# Patient Record
Sex: MALE | Race: WHITE | NOT HISPANIC OR LATINO | Employment: UNEMPLOYED | RURAL
[De-identification: names, ages, dates, MRNs, and addresses within clinical notes are randomized per-mention and may not be internally consistent; named-entity substitution may affect disease eponyms.]

---

## 2021-02-17 ENCOUNTER — OFFICE VISIT (OUTPATIENT)
Dept: FAMILY MEDICINE CLINIC | Facility: CLINIC | Age: 21
End: 2021-02-17

## 2021-02-17 VITALS
OXYGEN SATURATION: 98 % | BODY MASS INDEX: 28.92 KG/M2 | RESPIRATION RATE: 18 BRPM | TEMPERATURE: 98.2 F | HEART RATE: 113 BPM | HEIGHT: 65 IN | DIASTOLIC BLOOD PRESSURE: 86 MMHG | SYSTOLIC BLOOD PRESSURE: 128 MMHG | WEIGHT: 173.6 LBS

## 2021-02-17 DIAGNOSIS — R25.3 TWITCHING: ICD-10-CM

## 2021-02-17 DIAGNOSIS — F95.2 TOURETTE'S SYNDROME: Primary | ICD-10-CM

## 2021-02-17 DIAGNOSIS — Z13.29 SCREENING FOR HYPOTHYROIDISM: ICD-10-CM

## 2021-02-17 DIAGNOSIS — Z76.89 ENCOUNTER TO ESTABLISH CARE: ICD-10-CM

## 2021-02-17 DIAGNOSIS — Z13.220 SCREENING FOR HYPERLIPIDEMIA: ICD-10-CM

## 2021-02-17 PROBLEM — L08.9: Status: ACTIVE | Noted: 2018-05-29

## 2021-02-17 PROBLEM — L60.0 INGROWN NAIL OF FIFTH TOE OF LEFT FOOT: Status: ACTIVE | Noted: 2018-05-29
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
## 2021-02-17 PROBLEM — S90.465A: Status: ACTIVE | Noted: 2018-05-29

## 2021-02-17 PROCEDURE — 99214 OFFICE O/P EST MOD 30 MIN: CPT | Performed by: FAMILY MEDICINE

## 2021-02-17 NOTE — ASSESSMENT & PLAN NOTE
Psychological condition is worsening.  Referral neurology   This is a clinical diagnosis based on symptoms.  We will order blood work as well as a CT scan and EEG.  Psychological condition  will be reassessed at the next regular appointment.

## 2021-02-17 NOTE — ASSESSMENT & PLAN NOTE
The worsening twitching has likely due to symptoms of Tourette's syndrome, which has been undiagnosed.

## 2021-02-18 ENCOUNTER — TELEPHONE (OUTPATIENT)
Dept: FAMILY MEDICINE CLINIC | Facility: CLINIC | Age: 21
End: 2021-02-18

## 2021-02-18 DIAGNOSIS — F95.2 TOURETTE'S SYNDROME: Primary | ICD-10-CM

## 2021-02-18 DIAGNOSIS — R25.3 TWITCHING: ICD-10-CM

## 2021-02-18 LAB
25(OH)D3+25(OH)D2 SERPL-MCNC: 22 NG/ML (ref 30–100)
ALBUMIN SERPL-MCNC: 4.8 G/DL (ref 4.1–5.2)
ALBUMIN/GLOB SERPL: 1.5 {RATIO} (ref 1.2–2.2)
ALP SERPL-CCNC: 112 IU/L (ref 39–117)
ALT SERPL-CCNC: 32 IU/L (ref 0–44)
AST SERPL-CCNC: 21 IU/L (ref 0–40)
BASOPHILS # BLD AUTO: 0.1 X10E3/UL (ref 0–0.2)
BASOPHILS NFR BLD AUTO: 1 %
BILIRUB SERPL-MCNC: 0.3 MG/DL (ref 0–1.2)
BUN SERPL-MCNC: 10 MG/DL (ref 6–20)
BUN/CREAT SERPL: 12 (ref 9–20)
CALCIUM SERPL-MCNC: 9.7 MG/DL (ref 8.7–10.2)
CHLORIDE SERPL-SCNC: 101 MMOL/L (ref 96–106)
CHOLEST SERPL-MCNC: 137 MG/DL (ref 100–199)
CHOLEST/HDLC SERPL: 4.2 RATIO (ref 0–5)
CO2 SERPL-SCNC: 24 MMOL/L (ref 20–29)
CREAT SERPL-MCNC: 0.86 MG/DL (ref 0.76–1.27)
EOSINOPHIL # BLD AUTO: 0.4 X10E3/UL (ref 0–0.4)
EOSINOPHIL NFR BLD AUTO: 4 %
ERYTHROCYTE [DISTWIDTH] IN BLOOD BY AUTOMATED COUNT: 12.5 % (ref 11.6–15.4)
FOLATE SERPL-MCNC: 10.4 NG/ML
GLOBULIN SER CALC-MCNC: 3.2 G/DL (ref 1.5–4.5)
GLUCOSE SERPL-MCNC: 92 MG/DL (ref 65–99)
HCT VFR BLD AUTO: 48.5 % (ref 37.5–51)
HDLC SERPL-MCNC: 33 MG/DL
HGB BLD-MCNC: 17 G/DL (ref 13–17.7)
IMM GRANULOCYTES # BLD AUTO: 0 X10E3/UL (ref 0–0.1)
IMM GRANULOCYTES NFR BLD AUTO: 0 %
LDLC SERPL CALC-MCNC: 80 MG/DL (ref 0–99)
LYMPHOCYTES # BLD AUTO: 2.2 X10E3/UL (ref 0.7–3.1)
LYMPHOCYTES NFR BLD AUTO: 24 %
MCH RBC QN AUTO: 32.7 PG (ref 26.6–33)
MCHC RBC AUTO-ENTMCNC: 35.1 G/DL (ref 31.5–35.7)
MCV RBC AUTO: 93 FL (ref 79–97)
MONOCYTES # BLD AUTO: 0.7 X10E3/UL (ref 0.1–0.9)
MONOCYTES NFR BLD AUTO: 7 %
NEUTROPHILS # BLD AUTO: 5.8 X10E3/UL (ref 1.4–7)
NEUTROPHILS NFR BLD AUTO: 64 %
PLATELET # BLD AUTO: 381 X10E3/UL (ref 150–450)
POTASSIUM SERPL-SCNC: 4.7 MMOL/L (ref 3.5–5.2)
PROT SERPL-MCNC: 8 G/DL (ref 6–8.5)
RBC # BLD AUTO: 5.2 X10E6/UL (ref 4.14–5.8)
SODIUM SERPL-SCNC: 141 MMOL/L (ref 134–144)
TRIGL SERPL-MCNC: 136 MG/DL (ref 0–149)
TSH SERPL DL<=0.005 MIU/L-ACNC: 2.15 UIU/ML (ref 0.45–4.5)
VIT B12 SERPL-MCNC: 603 PG/ML (ref 232–1245)
VLDLC SERPL CALC-MCNC: 24 MG/DL (ref 5–40)
WBC # BLD AUTO: 9 X10E3/UL (ref 3.4–10.8)

## 2021-02-23 ENCOUNTER — HOSPITAL ENCOUNTER (OUTPATIENT)
Dept: NEUROLOGY | Facility: HOSPITAL | Age: 21
Discharge: HOME | End: 2021-02-23
Admitting: FAMILY MEDICINE

## 2021-02-23 PROCEDURE — 95816 EEG AWAKE AND DROWSY: CPT | Performed by: PSYCHIATRY & NEUROLOGY

## 2021-02-23 PROCEDURE — 95816 EEG AWAKE AND DROWSY: CPT

## 2021-02-24 ENCOUNTER — OFFICE VISIT (OUTPATIENT)
Dept: FAMILY MEDICINE CLINIC | Facility: CLINIC | Age: 21
End: 2021-02-24

## 2021-02-24 VITALS
DIASTOLIC BLOOD PRESSURE: 70 MMHG | BODY MASS INDEX: 29.42 KG/M2 | WEIGHT: 176.6 LBS | OXYGEN SATURATION: 98 % | RESPIRATION RATE: 18 BRPM | SYSTOLIC BLOOD PRESSURE: 140 MMHG | TEMPERATURE: 98.1 F | HEIGHT: 65 IN | HEART RATE: 106 BPM

## 2021-02-24 DIAGNOSIS — E55.9 VITAMIN D DEFICIENCY: ICD-10-CM

## 2021-02-24 DIAGNOSIS — F95.2 TOURETTE'S SYNDROME: Primary | ICD-10-CM

## 2021-02-24 PROCEDURE — 99213 OFFICE O/P EST LOW 20 MIN: CPT | Performed by: FAMILY MEDICINE

## 2021-02-24 NOTE — PROGRESS NOTES
"Chief Complaint  Labs Only    Subjective    History of Present Illness        Domenic Sandoval presents to [facility] for   2/24/2021- The patient is here today and following up on the labs that he had done on his last visit with us. His vitamin D was very low at 22. His cholesterol panel had a slightly elevated level.        Objective   Vital Signs:   Visit Vitals  /70   Pulse 106   Temp 98.1 °F (36.7 °C)   Resp 18   Ht 165.1 cm (65\")   Wt 80.1 kg (176 lb 9.6 oz)   SpO2 98%   BMI 29.39 kg/m²       Physical Exam  Vitals signs reviewed.   Constitutional:       Appearance: He is well-developed.   HENT:      Head: Normocephalic.      Right Ear: External ear normal.      Left Ear: External ear normal.      Nose: Nose normal.   Eyes:      Conjunctiva/sclera: Conjunctivae normal.   Neck:      Musculoskeletal: Normal range of motion and neck supple.   Cardiovascular:      Rate and Rhythm: Normal rate and regular rhythm.   Pulmonary:      Effort: Pulmonary effort is normal.      Breath sounds: Normal breath sounds.   Genitourinary:     Rectum: Guaiac result negative.   Musculoskeletal: Normal range of motion.   Skin:     General: Skin is warm and dry.      Capillary Refill: Capillary refill takes less than 2 seconds.   Neurological:      Mental Status: He is alert and oriented to person, place, and time.            Result Review :      Common labs    Common Labsle 2/17/21 2/17/21 2/17/21    1557 1557 1557   Glucose  92    BUN  10    Creatinine  0.86    eGFR Non  Am  124    eGFR African Am  143    Sodium  141    Potassium  4.7    Chloride  101    Calcium  9.7    Total Protein  8.0    Albumin  4.8    Total Bilirubin  0.3    Alkaline Phosphatase  112    AST (SGOT)  21    ALT (SGPT)  32    WBC 9.0     Hemoglobin 17.0     Hematocrit 48.5     Platelets 381     Total Cholesterol   137   Triglycerides   136   HDL Cholesterol   33 (A)   LDL Cholesterol    80   (A) Abnormal value        "     CMP    CMP 2/17/21   Glucose 92   BUN 10   Creatinine 0.86   eGFR Non  Am 124   eGFR African Am 143   Sodium 141   Potassium 4.7   Chloride 101   Calcium 9.7   Total Protein 8.0   Albumin 4.8   Globulin 3.2   Total Bilirubin 0.3   Alkaline Phosphatase 112   AST (SGOT) 21   ALT (SGPT) 32           CBC    CBC 2/17/21   WBC 9.0   RBC 5.20   Hemoglobin 17.0   Hematocrit 48.5   MCV 93   MCH 32.7   MCHC 35.1   RDW 12.5   Platelets 381           CBC w/diff    CBC w/Diff 2/17/21   WBC 9.0   RBC 5.20   Hemoglobin 17.0   Hematocrit 48.5   MCV 93   MCH 32.7   MCHC 35.1   RDW 12.5   Platelets 381   Neutrophil Rel % 64   Lymphocyte Rel % 24   Monocyte Rel % 7   Eosinophil Rel % 4   Basophil Rel % 1           Lipid Panel    Lipid Panel 2/17/21   Total Cholesterol 137   Triglycerides 136   HDL Cholesterol 33 (A)   VLDL Cholesterol 24   LDL Cholesterol  80   (A) Abnormal value            TSH    TSH 2/17/21   TSH 2.150                       Assessment and Plan      Diagnoses and all orders for this visit:    1. Tourette's syndrome (Primary)  Assessment & Plan:  Psychological condition is unchanged.  Continue current treatment regimen.   EEG pending.  Psychological condition  will be reassessed in 4 weeks.      2. Vitamin D deficiency           Follow Up   No follow-ups on file.  Patient was given instructions and counseling regarding his condition or for health maintenance advice. Please see specific information pulled into the AVS if appropriate.

## 2021-02-26 ENCOUNTER — HOSPITAL ENCOUNTER (OUTPATIENT)
Dept: CT IMAGING | Facility: HOSPITAL | Age: 21
Discharge: HOME | End: 2021-02-26
Admitting: FAMILY MEDICINE

## 2021-02-26 DIAGNOSIS — R25.3 TWITCHING: ICD-10-CM

## 2021-02-26 DIAGNOSIS — F95.2 TOURETTE'S SYNDROME: ICD-10-CM

## 2021-02-26 PROCEDURE — 70450 CT HEAD/BRAIN W/O DYE: CPT

## 2021-03-12 ENCOUNTER — TELEPHONE (OUTPATIENT)
Dept: FAMILY MEDICINE CLINIC | Facility: CLINIC | Age: 21
End: 2021-03-12

## 2021-03-12 NOTE — TELEPHONE ENCOUNTER
Called patient yesterday 3/11/2021 and told him I had reached out to the Neurology department for Dr Cooley and they said that they had tried to reach out to the patient a few times and was not able to get in touch with them.   I called the patient and provided him with the name and the number of the appointment team member for the neurology office ( [place] [phone] ext [phone])   Domenic said that he was going to give them a call to get an appointment.     He also responded to the [employer] message we sent to discuss a new mediation that dr. salinas wants tos tart him on.

## 2021-03-15 ENCOUNTER — TELEMEDICINE (OUTPATIENT)
Dept: FAMILY MEDICINE CLINIC | Facility: CLINIC | Age: 21
End: 2021-03-15

## 2021-03-15 DIAGNOSIS — F95.2 TOURETTE'S SYNDROME: Primary | ICD-10-CM

## 2021-03-15 PROCEDURE — 99213 OFFICE O/P EST LOW 20 MIN: CPT | Performed by: FAMILY MEDICINE

## 2021-03-15 RX ORDER — RISPERIDONE 0.25 MG/1
0.25 TABLET ORAL DAILY
Qty: 30 TABLET | Refills: 1 | Status: SHIPPED | OUTPATIENT
Start: 2021-03-15 | End: 2021-05-12

## 2021-03-15 NOTE — PROGRESS NOTES
Chief Complaint   Patient presents with   • Tourette Syndrome     Video visit    History of Present Illness:  Subjective   Domenic Sandoval is a 21 y.o. male.   Patient doing a visit today to discuss starting medication to help Tourette's Syndrome.  The patient's tics have not slowed down since his initial visit here in the clinic.  The patient and his mom were wanting to know if there was a treatment to slow down or stop the tics.  Mom states that on abdominal plane ride and would like to be able to go to the airport without any incidents.       Allergies:  Allergies   Allergen Reactions   • Penicillins Swelling       Social History:  Social History     Socioeconomic History   • Marital status: Single     Spouse name: Not on file   • Number of children: Not on file   • Years of education: Not on file   • Highest education level: Not on file   Tobacco Use   • Smoking status: Never Smoker   • Smokeless tobacco: Never Used       Family History:  Family History   Problem Relation Age of Onset   • Hypertension Father    • Other Father         epilepsy   • Heart disease Maternal Grandfather        Past Medical History :  Active Ambulatory Problems     Diagnosis Date Noted   • Ingrown nail of fifth toe of left foot 05/29/2018   • Tick bite of toe of left foot with infection 05/29/2018   • Tourette's syndrome 02/17/2021   • Twitching 02/17/2021     Resolved Ambulatory Problems     Diagnosis Date Noted   • No Resolved Ambulatory Problems     No Additional Past Medical History       Medication List:  Outpatient Encounter Medications as of 3/15/2021   Medication Sig Dispense Refill   • risperiDONE (risperDAL) 0.25 MG tablet Take 1 tablet by mouth Daily. 30 tablet 1     No facility-administered encounter medications on file as of 3/15/2021.       Past Surgical History:  No past surgical history on file.     The following portions of the patient's history were reviewed and updated as appropriate: allergies, current medications,  past family history, past medical history, past social history, past surgical history and problem list.    Review Of Systems:  Review of Systems   Constitutional: Negative for activity change, appetite change and fatigue.   HENT: Negative for ear discharge, ear pain, postnasal drip and rhinorrhea.    Eyes: Negative for double vision and discharge.   Respiratory: Negative for cough, chest tightness and shortness of breath.    Cardiovascular: Negative for chest pain.   Endocrine: Negative for cold intolerance and heat intolerance.   Musculoskeletal: Negative for back pain, gait problem and joint swelling.   Skin: Negative for color change and rash.   Neurological: Negative for dizziness, facial asymmetry and confusion.        Verbal tics   Psychiatric/Behavioral: Negative for behavioral problems.       Objective     Physical Exam:  Vital Signs:  There were no vitals taken for this visit.    Physical Exam  Constitutional:       Appearance: Normal appearance.   Neurological:      Mental Status: He is alert and oriented to person, place, and time.           Assessment/Plan   Assessment and Plan:  Diagnoses and all orders for this visit:    1. Tourette's syndrome (Primary)  Assessment & Plan:  Psychological condition is worsening.  Continue current treatment regimen.  Patient was started on risperidone 0.25 mg to help treat his symptoms.  Psychological condition  will be reassessed in 2 weeks.    Orders:  -     risperiDONE (risperDAL) 0.25 MG tablet; Take 1 tablet by mouth Daily.  Dispense: 30 tablet; Refill: 1    The use of a video visit has been reviewed with the patient and verbal informed consent has been obtained.       Spent 10 minutes with patient and greater than 50% of visit spent on counseling and coordination of care regarding the patient's illness, along with the pros and cons of various treatment options.

## 2021-03-17 NOTE — ASSESSMENT & PLAN NOTE
Psychological condition is worsening.  Continue current treatment regimen.  Patient was started on risperidone 0.25 mg to help treat his symptoms.  Psychological condition  will be reassessed in 2 weeks.

## 2021-05-11 DIAGNOSIS — F95.2 TOURETTE'S SYNDROME: ICD-10-CM

## 2021-05-12 RX ORDER — RISPERIDONE 0.25 MG/1
0.25 TABLET ORAL DAILY
Qty: 30 TABLET | Refills: 1 | Status: SHIPPED | OUTPATIENT
Start: 2021-05-12 | End: 2021-05-19 | Stop reason: SDUPTHER

## 2021-05-19 DIAGNOSIS — F95.2 TOURETTE'S SYNDROME: ICD-10-CM

## 2021-05-19 RX ORDER — RISPERIDONE 0.25 MG/1
0.25 TABLET ORAL DAILY
Qty: 30 TABLET | Refills: 1 | Status: SHIPPED | OUTPATIENT
Start: 2021-05-19 | End: 2021-06-14 | Stop reason: SDUPTHER

## 2021-05-19 NOTE — TELEPHONE ENCOUNTER
Caller: ROSSANA MCKINNEY    Relationship: Mother    Best call back number: [phone]    Medication needed:   Requested Prescriptions     Pending Prescriptions Disp Refills   • risperiDONE (risperDAL) 0.25 MG tablet 30 tablet 1     Sig: Take 1 tablet by mouth Daily.       When do you need the refill by: ASAP     What additional details did the patient provide when requesting the medication: MUST BE FILLED AT A 90 DAY SUPPLY. PATIENT IS OUT OF MEDICATION     Does the patient have less than a 3 day supply:  [x] Yes  [] No    What is the patient's preferred pharmacy: [facility] #[id number] - [place], [place] - [address] - [phone]  - [phone] FX

## 2021-06-14 DIAGNOSIS — F95.2 TOURETTE'S SYNDROME: ICD-10-CM

## 2021-06-14 NOTE — TELEPHONE ENCOUNTER
Caller: ROSSANA MCKINNEY    Relationship: Mother    Best call back number: [phone] (H)    Medication needed:   Requested Prescriptions     Pending Prescriptions Disp Refills   • risperiDONE (risperDAL) 0.25 MG tablet 30 tablet 1     Sig: Take 1 tablet by mouth Daily.       When do you need the refill by: ASAP    What additional details did the patient provide when requesting the medication: PATIENT HAS 5 DAY SUPPLY LEFT, IS REQUESTING A REFILL UNTIL FIRST AVAILABLE SCHEDULED APPOINTMENT ON 06/30/2021, PLEASE ADVISE PATIENT'S MOTHER WHEN SENT TO PHARMACY    Does the patient have less than a 3 day supply:  [] Yes  [x] No    What is the patient's preferred pharmacy: [facility] #[id number] - [place], [place] - [address] - [phone]  - [phone] FX

## 2021-06-17 RX ORDER — RISPERIDONE 0.25 MG/1
0.25 TABLET ORAL DAILY
Qty: 30 TABLET | Refills: 1 | Status: SHIPPED | OUTPATIENT
Start: 2021-06-17 | End: 2021-06-30 | Stop reason: SDUPTHER

## 2021-06-30 ENCOUNTER — OFFICE VISIT (OUTPATIENT)
Dept: FAMILY MEDICINE CLINIC | Facility: CLINIC | Age: 21
End: 2021-06-30

## 2021-06-30 DIAGNOSIS — F95.2 TOURETTE'S SYNDROME: Primary | ICD-10-CM

## 2021-06-30 DIAGNOSIS — Z23 NEED FOR TDAP VACCINATION: ICD-10-CM

## 2021-06-30 PROCEDURE — 90715 TDAP VACCINE 7 YRS/> IM: CPT | Performed by: FAMILY MEDICINE

## 2021-06-30 PROCEDURE — 99213 OFFICE O/P EST LOW 20 MIN: CPT | Performed by: FAMILY MEDICINE

## 2021-06-30 PROCEDURE — 90471 IMMUNIZATION ADMIN: CPT | Performed by: FAMILY MEDICINE

## 2021-06-30 RX ORDER — RISPERIDONE 0.5 MG/1
0.5 TABLET ORAL 2 TIMES DAILY
Qty: 180 TABLET | Refills: 1 | Status: SHIPPED | OUTPATIENT
Start: 2021-06-30 | End: 2021-12-20 | Stop reason: SDUPTHER

## 2021-06-30 NOTE — PROGRESS NOTES
Chief Complaint  Tourette Syndrome    Subjective    History of Present Illness        Domenic Sandoval presents to [facility] for   History of Present Illness   Sudden Behavior Change: Tourette's syndrome   2/17/2021- The patient and his mother are here today and she states that for the last 3 weeks he has been having Auditory phrases and noises and movements that have become louder and more boisterous. She states that the last 2 weeks have been harder as her sons conditions has worsened. She states that it started with Tongue clicking and whistling and she states that this has turned in to Loud sounds and phrases. She states that there are days that he says new things and things that she has not heard him say prior to this and while he has been delaing with it. She states that he has some Neck twitching and she states that when this happens it usually is followed by some loud and or some type of words or phrases. She states that one of his phrases is ( B and tongue clicking Breakfast ). She states that he has as well had times that he will throw the middle finger up at times and she states that he starts winking. She states that this has become very uncontrollable and she states that this is a huge adjustment for the home. She states that he was always a perfectly normal child and she states that he always had a clean bill of health and she states that she is wondering as to why this has happened all the sudden. She states that she has another child and she states that they have told her he has a TIC (  He clears his throat) but she said his pediatrician says he is fine.   6/30/2021- The patient is here today and following up on his medication that he was started on to treat his tourette's syndrome. Mother states that when he was first started the medication they was seen a huge difference and she states that the tics had lessened but she states that in the last couple weeks ( 2 ) that  they have came back more aggressive and she states that she feels they have increased. She states that she feels he is back to when he first started with all of this and before he was controlled. She states that she is not sure as to if he is needing a dose increase and or if there is something more that can be given to help control his Tics.  She states that they have to talk to his college and she if there is something that they need to do in order for him to return to his college courses as she states that he is very close to graduating and she states that she hates for him to miss this due to his disability. She states that she is wanting to see about getting him in to a therapist and she states that she is not sure as tow here to start.     Objective   Vital Signs:   There were no vitals taken for this visit.    Physical Exam  Vitals reviewed.   Constitutional:       Appearance: He is well-developed.   HENT:      Head: Normocephalic.      Right Ear: External ear normal.      Left Ear: External ear normal.      Nose: Nose normal.   Eyes:      Conjunctiva/sclera: Conjunctivae normal.   Cardiovascular:      Rate and Rhythm: Normal rate and regular rhythm.   Pulmonary:      Effort: Pulmonary effort is normal.      Breath sounds: Normal breath sounds.   Musculoskeletal:         General: Normal range of motion.      Cervical back: Normal range of motion and neck supple.   Skin:     General: Skin is warm and dry.      Capillary Refill: Capillary refill takes less than 2 seconds.   Neurological:      Mental Status: He is alert and oriented to person, place, and time.            Result Review :                    Assessment and Plan      Diagnoses and all orders for this visit:    1. Tourette's syndrome (Primary)  Assessment & Plan:  Psychological condition is worsening.  Medication changes per orders.  Psychological condition  will be reassessed in 4 weeks.    Orders:  -     risperiDONE (risperDAL) 0.5 MG tablet; Take  1 tablet by mouth 2 (Two) Times a Day.  Dispense: 180 tablet; Refill: 1    2. Need for Tdap vaccination  -     Tdap Vaccine Greater Than or Equal To 6yo IM           Follow Up   No follow-ups on file.  Patient was given instructions and counseling regarding his condition or for health maintenance advice. Please see specific information pulled into the AVS if appropriate.

## 2021-07-10 DIAGNOSIS — F95.2 TOURETTE'S SYNDROME: ICD-10-CM

## 2021-07-30 ENCOUNTER — OFFICE VISIT (OUTPATIENT)
Dept: FAMILY MEDICINE CLINIC | Facility: CLINIC | Age: 21
End: 2021-07-30

## 2021-07-30 VITALS
OXYGEN SATURATION: 98 % | BODY MASS INDEX: 30.75 KG/M2 | HEIGHT: 65 IN | RESPIRATION RATE: 18 BRPM | HEART RATE: 110 BPM | TEMPERATURE: 97.8 F | SYSTOLIC BLOOD PRESSURE: 138 MMHG | WEIGHT: 184.6 LBS | DIASTOLIC BLOOD PRESSURE: 88 MMHG

## 2021-07-30 DIAGNOSIS — F95.2 TOURETTE'S SYNDROME: Primary | ICD-10-CM

## 2021-07-30 PROCEDURE — 99213 OFFICE O/P EST LOW 20 MIN: CPT | Performed by: FAMILY MEDICINE

## 2021-07-30 RX ORDER — RISPERIDONE 0.25 MG/1
TABLET ORAL
Qty: 30 TABLET | Refills: 1 | OUTPATIENT
Start: 2021-07-30

## 2021-07-30 NOTE — ASSESSMENT & PLAN NOTE
Psychological condition is improving with treatment.  Continue current treatment regimen.   Patient reports that he will see a psychiatrist to help determine what is causing his decreased attention.  Psychological condition  will be reassessed in 3 months.

## 2021-07-30 NOTE — PROGRESS NOTES
Chief Complaint  Tourette Syndrome    Subjective    History of Present Illness        Domenic Sandoval presents to [facility] for   History of Present Illness    Sudden Behavior Change: Tourette's syndrome   2/17/2021- The patient and his mother are here today and she states that for the last 3 weeks he has been having Auditory phrases and noises and movements that have become louder and more boisterous. She states that the last 2 weeks have been harder as her sons conditions has worsened. She states that it started with Tongue clicking and whistling and she states that this has turned in to Loud sounds and phrases. She states that there are days that he says new things and things that she has not heard him say prior to this and while he has been delaing with it. She states that he has some Neck twitching and she states that when this happens it usually is followed by some loud and or some type of words or phrases. She states that one of his phrases is ( B and tongue clicking Breakfast ). She states that he has as well had times that he will throw the middle finger up at times and she states that he starts winking. She states that this has become very uncontrollable and she states that this is a huge adjustment for the home. She states that he was always a perfectly normal child and she states that he always had a clean bill of health and she states that she is wondering as to why this has happened all the sudden. She states that she has another child and she states that they have told her he has a TIC (  He clears his throat) but she said his pediatrician says he is fine.   6/30/2021- The patient is here today and following up on his medication that he was started on to treat his tourette's syndrome. Mother states that when he was first started the medication they was seen a huge difference and she states that the tics had lessened but she states that in the last couple weeks ( 2 )  "that they have came back more aggressive and she states that she feels they have increased. She states that she feels he is back to when he first started with all of this and before he was controlled. She states that she is not sure as to if he is needing a dose increase and or if there is something more that can be given to help control his Tics.  She states that they have to talk to his college and she if there is something that they need to do in order for him to return to his college courses as she states that he is very close to graduating and she states that she hates for him to miss this due to his disability. She states that she is wanting to see about getting him in to a therapist and she states that she is not sure as tow here to start.   7/30/21: Patient is here today to follow up regarding his medication changes from last month. Patient was increased from Resperidone 0.25 mg daily to 0.5mg BID. He reports that he feels this dose is working much better for him. He feels like this medication is helping to control his tics more. He is currently taking a break from school for this semester.   He does not currently need a refill of this medication.     Objective   Vital Signs:   Visit Vitals  /88   Pulse 110   Temp 97.8 °F (36.6 °C)   Resp 18   Ht 165.1 cm (65\")   Wt 83.7 kg (184 lb 9.6 oz)   SpO2 98%   BMI 30.72 kg/m²       Physical Exam  Vitals reviewed.   Constitutional:       Appearance: He is well-developed.   HENT:      Head: Normocephalic.      Right Ear: External ear normal.      Left Ear: External ear normal.      Nose: Nose normal.   Eyes:      Conjunctiva/sclera: Conjunctivae normal.   Cardiovascular:      Rate and Rhythm: Normal rate and regular rhythm.   Pulmonary:      Effort: Pulmonary effort is normal.      Breath sounds: Normal breath sounds.   Musculoskeletal:         General: Normal range of motion.      Cervical back: Normal range of motion and neck supple.   Skin:     General: Skin " is warm and dry.      Capillary Refill: Capillary refill takes less than 2 seconds.   Neurological:      Mental Status: He is alert and oriented to person, place, and time.            Result Review :                      Assessment and Plan      Diagnoses and all orders for this visit:    1. Tourette's syndrome (Primary)  Assessment & Plan:  Psychological condition is improving with treatment.  Continue current treatment regimen.   Patient reports that he will see a psychiatrist to help determine what is causing his decreased attention.  Psychological condition  will be reassessed in 3 months.             Follow Up   No follow-ups on file.  Patient was given instructions and counseling regarding his condition or for health maintenance advice. Please see specific information pulled into the AVS if appropriate.

## 2021-09-08 ENCOUNTER — TELEPHONE (OUTPATIENT)
Dept: FAMILY MEDICINE CLINIC | Facility: CLINIC | Age: 21
End: 2021-09-08

## 2021-09-08 NOTE — TELEPHONE ENCOUNTER
Patient's mother called. Scheduled first available 9/16. Mother wants to know if there's anything he needs to do for this until the appointment, she also wants a call if he needs to be seen sooner.

## 2021-09-17 ENCOUNTER — OFFICE VISIT (OUTPATIENT)
Dept: FAMILY MEDICINE CLINIC | Facility: CLINIC | Age: 21
End: 2021-09-17

## 2021-09-17 VITALS
SYSTOLIC BLOOD PRESSURE: 112 MMHG | TEMPERATURE: 97.5 F | DIASTOLIC BLOOD PRESSURE: 60 MMHG | BODY MASS INDEX: 30.12 KG/M2 | HEART RATE: 82 BPM | HEIGHT: 65 IN | WEIGHT: 180.8 LBS | RESPIRATION RATE: 16 BRPM | OXYGEN SATURATION: 97 %

## 2021-09-17 DIAGNOSIS — R10.84 GENERALIZED ABDOMINAL PAIN: Primary | ICD-10-CM

## 2021-09-17 PROCEDURE — 99213 OFFICE O/P EST LOW 20 MIN: CPT | Performed by: FAMILY MEDICINE

## 2021-10-04 PROBLEM — R10.84 GENERALIZED ABDOMINAL PAIN: Status: ACTIVE | Noted: 2021-10-04

## 2021-10-04 NOTE — ASSESSMENT & PLAN NOTE
Patient reports going to the ER today prior to coming to the clinic his pain resolved.  Patient's records were reviewed from [facility].  Patient not having trouble with bowel movements or urinating.  Patient was encouraged to return to clinic if symptoms persist

## 2021-12-20 ENCOUNTER — OFFICE VISIT (OUTPATIENT)
Dept: FAMILY MEDICINE CLINIC | Facility: CLINIC | Age: 21
End: 2021-12-20

## 2021-12-20 VITALS
SYSTOLIC BLOOD PRESSURE: 130 MMHG | OXYGEN SATURATION: 98 % | BODY MASS INDEX: 31.39 KG/M2 | TEMPERATURE: 97.5 F | WEIGHT: 188.4 LBS | HEIGHT: 65 IN | RESPIRATION RATE: 18 BRPM | DIASTOLIC BLOOD PRESSURE: 90 MMHG | HEART RATE: 118 BPM

## 2021-12-20 DIAGNOSIS — R31.9 HEMATURIA, UNSPECIFIED TYPE: ICD-10-CM

## 2021-12-20 DIAGNOSIS — F95.2 TOURETTE'S SYNDROME: Primary | ICD-10-CM

## 2021-12-20 LAB
BILIRUB BLD-MCNC: ABNORMAL MG/DL
CLARITY, POC: CLEAR
COLOR UR: YELLOW
EXPIRATION DATE: ABNORMAL
GLUCOSE UR STRIP-MCNC: NEGATIVE MG/DL
KETONES UR QL: ABNORMAL
LEUKOCYTE EST, POC: NEGATIVE
Lab: ABNORMAL
NITRITE UR-MCNC: NEGATIVE MG/ML
PH UR: 5 [PH] (ref 5–8)
PROT UR STRIP-MCNC: ABNORMAL MG/DL
RBC # UR STRIP: NEGATIVE /UL
SP GR UR: 1.01 (ref 1–1.03)
UROBILINOGEN UR QL: NORMAL

## 2021-12-20 PROCEDURE — 99213 OFFICE O/P EST LOW 20 MIN: CPT | Performed by: FAMILY MEDICINE

## 2021-12-20 PROCEDURE — 81003 URINALYSIS AUTO W/O SCOPE: CPT | Performed by: FAMILY MEDICINE

## 2021-12-20 RX ORDER — RISPERIDONE 1 MG/1
1 TABLET ORAL 2 TIMES DAILY
Qty: 180 TABLET | Refills: 2 | Status: SHIPPED | OUTPATIENT
Start: 2021-12-20 | End: 2022-08-17

## 2021-12-20 NOTE — PROGRESS NOTES
"Chief Complaint  Tourette Syndrome and Blood in Urine    Subjective    History of Present Illness        Domenic Sandoval presents to [facility] for   Domenic is here to follow up on hematuria from a few months ago. He reports his symptoms have subsided. He is also here to follow up on his tourette's.       Blood in Urine  This is a new problem. The current episode started more than 1 month ago. The problem has been resolved since onset. He reports no clotting in his urine stream. His pain is at a severity of 0/10. He is experiencing no pain. He describes his urine color as clear. Irritative symptoms do not include frequency or urgency. Pertinent negatives include no abdominal pain, dysuria, hesitancy, nausea or vomiting.        Objective   Vital Signs:   Visit Vitals  /90 (BP Location: Right arm, Patient Position: Sitting, Cuff Size: Adult)   Pulse 118   Temp 97.5 °F (36.4 °C) (Temporal)   Resp 18   Ht 165.1 cm (65\")   Wt 85.5 kg (188 lb 6.4 oz)   SpO2 98% Comment: room air   BMI 31.35 kg/m²       Physical Exam  Vitals reviewed.   Constitutional:       Appearance: He is well-developed.   HENT:      Head: Normocephalic.      Right Ear: External ear normal.      Left Ear: External ear normal.      Nose: Nose normal.   Eyes:      Conjunctiva/sclera: Conjunctivae normal.   Cardiovascular:      Rate and Rhythm: Normal rate and regular rhythm.   Pulmonary:      Effort: Pulmonary effort is normal.      Breath sounds: Normal breath sounds.   Abdominal:      General: Abdomen is flat. Bowel sounds are normal. There is no distension.      Palpations: Abdomen is soft. There is no mass.      Tenderness: There is no abdominal tenderness.   Musculoskeletal:         General: Normal range of motion.      Cervical back: Normal range of motion and neck supple.   Skin:     General: Skin is warm and dry.      Capillary Refill: Capillary refill takes less than 2 seconds.   Neurological:      Mental " Status: He is alert and oriented to person, place, and time.            Result Review :                    Assessment and Plan      Diagnoses and all orders for this visit:    1. Tourette's syndrome (Primary)  Assessment & Plan:  Psychological condition is worsening.  Medication changes per orders.  Psychological condition  will be reassessed in 3 months.    Orders:  -     risperiDONE (risperDAL) 1 MG tablet; Take 1 tablet by mouth 2 (Two) Times a Day.  Dispense: 180 tablet; Refill: 2    2. Hematuria, unspecified type  Assessment & Plan:  Urinalysis ordered and results pending.  His symptoms are otherwise improved.     Orders:  -     POCT urinalysis dipstick, automated           Follow Up   No follow-ups on file.  Patient was given instructions and counseling regarding his condition or for health maintenance advice. Please see specific information pulled into the AVS if appropriate.

## 2022-02-02 ENCOUNTER — OFFICE VISIT (OUTPATIENT)
Dept: PSYCHIATRY | Facility: CLINIC | Age: 22
End: 2022-02-02

## 2022-02-02 DIAGNOSIS — F95.2 TOURETTE'S SYNDROME: Primary | Chronic | ICD-10-CM

## 2022-02-02 DIAGNOSIS — F42.2 MIXED OBSESSIONAL THOUGHTS AND ACTS: Chronic | ICD-10-CM

## 2022-02-02 PROCEDURE — 90792 PSYCH DIAG EVAL W/MED SRVCS: CPT | Performed by: PSYCHIATRY & NEUROLOGY

## 2022-02-02 RX ORDER — CLONIDINE HYDROCHLORIDE 0.1 MG/1
0.1 TABLET ORAL 2 TIMES DAILY
Qty: 60 TABLET | Refills: 11 | Status: SHIPPED | OUTPATIENT
Start: 2022-02-02 | End: 2022-02-25

## 2022-02-02 RX ORDER — FLUVOXAMINE MALEATE 25 MG
25 TABLET ORAL NIGHTLY
Qty: 30 TABLET | Refills: 1 | Status: SHIPPED | OUTPATIENT
Start: 2022-02-02 | End: 2022-02-25 | Stop reason: SDUPTHER

## 2022-02-02 NOTE — PROGRESS NOTES
"Subjective   Domenic Sandoval is a 22 y.o. male who presents today for initial evaluation     Chief Complaint:  Tourettes, decreased concentration     History of Present Illness: the pt started having involuntary movements, jerking movements of his arms and vocal tics (\"breakfast \" in funny childish voice, sometimes he is saying more complex sentences) , no stressors at that time. The pt goes to college  But has troubles with concentration  And difficult to complete tasks.  The pt reported issues with concentration since he remembers himself, when he had to do school work, chores at home , unable to stay focused at school, daydreaming , his grades dropped and failed few classes at college.   Now off school   Mood - anxious, getting nervous more often, but denied feeling depressed/hopeless/helpless   Denied AVH/SI/HI   + thought that he does not think they are his own , thoughts about parents having sex, those thoughts are very disturbing , unpleasant but he can not get rid of them   The pt does have few friends but prefers to play video games, avoids in person  interactions       The following portions of the patient's history were reviewed and updated as appropriate: allergies, current medications, past family history, past medical history, past social history, past surgical history and problem list.    PAST PSYCHIATRIC HISTORY  Axis I  Affective/Bipolar Disorder, Anxiety/Panic Disorder  michaele inpt x1, SA - by cutting wrists , no sutures required   Axis II  Defer     PAST OUTPATIENT TREATMENT  Diagnosis treated:  Affective Disorder, Anxiety/Panic Disorder  Therapist once , did not go back   Treatment Type:  Medication Management  Prior Psychiatric Medications:  Folates  Risperidone now for 1 year - effective     Support Groups:  None   Sequelae Of Mental Disorder:  job disruption, social isolation, emotional distress          Interval History  Deteriorated    Side Effects  Risperidone - weight gain       Past " Medical History:  Past Medical History:   Diagnosis Date   • Anxiety        Social History:  Social History     Socioeconomic History   • Marital status: Single   Tobacco Use   • Smoking status: Never Smoker   • Smokeless tobacco: Never Used   Vaping Use   • Vaping Use: Never used   Substance and Sexual Activity   • Alcohol use: Yes     Comment: 1- 2 per year    • Drug use: Never   • Sexual activity: Defer       Family History:  Family History   Problem Relation Age of Onset   • Hypertension Father    • Other Father         epilepsy   • Anxiety disorder Father    • Depression Father    • Heart disease Maternal Grandfather        Past Surgical History:  History reviewed. No pertinent surgical history.    Problem List:  Patient Active Problem List   Diagnosis   • Ingrown nail of fifth toe of left foot   • Tick bite of toe of left foot with infection   • Tourette's syndrome   • Twitching   • Generalized abdominal pain   • Hematuria   • Mixed obsessional thoughts and acts       Allergy:   Allergies   Allergen Reactions   • Penicillins Swelling        Discontinued Medications:  There are no discontinued medications.    Current Medications:   Current Outpatient Medications   Medication Sig Dispense Refill   • cloNIDine (Catapres) 0.1 MG tablet Take 1 tablet by mouth 2 (Two) Times a Day. 60 tablet 11   • fluvoxaMINE (LUVOX) 25 MG tablet Take 1 tablet by mouth Every Night. 30 tablet 1   • risperiDONE (risperDAL) 1 MG tablet Take 1 tablet by mouth 2 (Two) Times a Day. 180 tablet 2     No current facility-administered medications for this visit.         Psychological ROS: positive for - anxiety, concentration difficulties and involuntary movements , obsessive thoughts   negative for - depression, disorientation, hallucinations, hostility, irritability or mood swings      Physical Exam:   There were no vitals taken for this visit.    Mental Status Exam:   Hygiene:   good  Cooperation:  Cooperative  Eye Contact:  limited    Psychomotor Behavior:  Appropriate  Affect:  Restricted  Mood: anxious  Hopelessness: Denies  Speech:  Monotone  Thought Process:  Goal directed and Linear  Thought Content:  Mood congruent, intrusive and obsessive thoughts with unpleasant content   Suicidal:  None  Homicidal:  None  Hallucinations:  None  Delusion:  None  Memory:  Intact  Orientation:  Person, Place, Time and Situation  Reliability:  fair  Insight:  Fair  Judgement:  Fair  Impulse Control:  Poor  Physical/Medical Issues:  No        PHQ-9 Depression Screening  Little interest or pleasure in doing things? 1   Feeling down, depressed, or hopeless? 1   Trouble falling or staying asleep, or sleeping too much? 0   Feeling tired or having little energy? 2   Poor appetite or overeating? 0   Feeling bad about yourself - or that you are a failure or have let yourself or your family down? 2   Trouble concentrating on things, such as reading the newspaper or watching television? 3   Moving or speaking so slowly that other people could have noticed? Or the opposite - being so fidgety or restless that you have been moving around a lot more than usual? 0   Thoughts that you would be better off dead, or of hurting yourself in some way? 0   PHQ-9 Total Score 9   If you checked off any problems, how difficult have these problems made it for you to do your work, take care of things at home, or get along with other people? Very difficult           Never smoker    I advised Domenic of the risks of tobacco use.     Lab Results:   Office Visit on 12/20/2021   Component Date Value Ref Range Status   • Color 12/20/2021 Yellow  Yellow, Straw, Dark Yellow, Maude Final   • Clarity, UA 12/20/2021 Clear  Clear Final   • Specific Gravity  12/20/2021 1.010  1.005 - 1.030 Final   • pH, Urine 12/20/2021 5.0  5.0 - 8.0 Final   • Leukocytes 12/20/2021 Negative  Negative Final   • Nitrite, UA 12/20/2021 Negative  Negative Final   • Protein, POC 12/20/2021 Trace* Negative mg/dL Final    • Glucose, UA 12/20/2021 Negative  Negative, 1000 mg/dL (3+) mg/dL Final   • Ketones, UA 12/20/2021 Trace* Negative Final   • Urobilinogen, UA 12/20/2021 Normal  Normal Final   • Bilirubin 12/20/2021 Small (1+)* Negative Final   • Blood, UA 12/20/2021 Negative  Negative Final   • Lot Number 12/20/2021 na   Final   • Expiration Date 12/20/2021 na   Final       Assessment/Plan   Problems Addressed this Visit        Mental Health    Mixed obsessional thoughts and acts (Chronic)    Relevant Medications    fluvoxaMINE (LUVOX) 25 MG tablet    Other Relevant Orders    NeuroPsych Testing    Tourette's syndrome - Primary    Relevant Medications    cloNIDine (Catapres) 0.1 MG tablet    fluvoxaMINE (LUVOX) 25 MG tablet      Diagnoses       Codes Comments    Tourette's syndrome    -  Primary ICD-10-CM: F95.2  ICD-9-CM: 307.23     Mixed obsessional thoughts and acts     ICD-10-CM: F42.2  ICD-9-CM: 300.3           Visit Diagnoses:    ICD-10-CM ICD-9-CM   1. Tourette's syndrome  F95.2 307.23   2. Mixed obsessional thoughts and acts  F42.2 300.3       TREATMENT PLAN/GOALS: Continue supportive psychotherapy efforts and medications as indicated. Treatment and medication options discussed during today's visit. Patient ackowledged and verbally consented to continue with current treatment plan and was educated on the importance of compliance with treatment and follow-up appointments.    MEDICATION ISSUES:  INSPECT reviewed as expected - no controlled meds   1. Tourette's syndrome - cont rispeirdone and add clonidint 0.1 mg BID   2. OCD- fluvoxamine 25 mg , will titrate up if tolerates   The pt needs neuropsychic assessment - referred to roderick or ACP   For diagnostic clarifications       Discussed medication options and treatment plan of prescribed medication as well as the risks, benefits, and side effects including potential falls, possible impaired driving and metabolic adversities among others. Patient is agreeable to call the  office with any worsening of symptoms or onset of side effects. Patient is agreeable to call 911 or go to the nearest ER should he/she begin having SI/HI. No medication side effects or related complaints today.     MEDS ORDERED DURING VISIT:  New Medications Ordered This Visit   Medications   • cloNIDine (Catapres) 0.1 MG tablet     Sig: Take 1 tablet by mouth 2 (Two) Times a Day.     Dispense:  60 tablet     Refill:  11   • fluvoxaMINE (LUVOX) 25 MG tablet     Sig: Take 1 tablet by mouth Every Night.     Dispense:  30 tablet     Refill:  1       Return in about 2 months (around 4/2/2022).         This document has been electronically signed by Eduarda Carter MD  February 2, 2022 08:37 EST    Part of this note may be an electronic transcription/translation of spoken language to printed text using the Dragon Dictation System.

## 2022-02-02 NOTE — PATIENT INSTRUCTIONS
Obsessive-Compulsive Disorder  Obsessive-compulsive disorder (OCD) is a brain-based disorder. This type of disorder happens when parts of the brain cannot communicate well with each other. People with OCD have obsessions or compulsions, or both. Obsessions are unwanted and distressing thoughts, ideas, or urges that keep entering your mind. You may find yourself trying to ignore them. You may try to stop or undo them with a compulsion.  Compulsions are repetitive physical or mental acts that you feel you have to do. They may reduce or prevent any anxiety, but in most cases, they do not help. Compulsions can take a lot of time to do, often more than one hour each day. They can interfere with personal relationships and normal activities at home, school, or work.  OCD can begin in childhood, but it usually starts in young adulthood and continues throughout life. Many people with OCD also have depression or another mental health disorder.  What are the causes?  The cause of this condition is not known.  What increases the risk?  This condition is more likely to develop in:  · People who have experienced trauma.  · People who have a family history of OCD.  · Women during and after pregnancy.  · Some children between the ages of 3 and 12 who have had a recent streptococcal infection.  · People who have other mental health conditions.  · People who misuse substances, such as alcohol, prescription medicines, or illegal drugs.  What are the signs or symptoms?  Symptoms of OCD include obsessions and compulsions. Most people with OCD have both of these, but some people with OCD have just one or the other. People with obsessions usually have a fear that something terrible will happen or that they will do something terrible. Common obsessions include:  · Fear of contamination with germs, waste, or toxic substances.  · Fear of making the wrong decision.  · Violent or sexual thoughts or urges toward others.  · Need for symmetry or  exactness.  Common compulsions include:  · Excessive handwashing or bathing due to fear of contamination.  · Checking things again and again to make sure you finished a task, such as making sure you locked a door or unplugged a toaster.  · Repeating an act or phrase again and again, sometimes a specific number of times, until it feels right.  · Arranging objects again and again to keep them in a certain order.  · Having a very hard time making a decision and sticking to it.  Everyone at times will repeat a behavior or check something again. However, people who have OCD feel that they do not have any control over their repeat thoughts or compulsive behaviors.  How is this diagnosed?  OCD is diagnosed through an assessment by your health care provider. Your health care provider may:  · Ask questions about any obsessions or compulsions you have and how they affect your life.  · Ask about your medical history, prescription medicines, and drug use. Certain medical conditions and substances can cause symptoms that are similar to OCD.  · Refer you to a mental health specialist who will ask you questions and may give you tests to confirm this diagnosis. He or she will help you create a plan for treatment.  How is this treated?  OCD may be treated with:  · Cognitive therapy. This is a form of talk therapy. The goal is to identify and change the irrational thoughts associated with obsessions.  · Behavioral therapy. A type of behavioral therapy called exposure and response prevention is often used. In this therapy, you will be exposed to the distressing situation that triggers your compulsion and be prevented from responding to it. With repetition of this process over time, you will no longer feel the distress or need to perform the compulsion.  · Self-soothing. Meditation, deep breathing, or yoga can help you manage the symptoms of anxiety and can help with how you think.  · Medicine. Certain types of antidepressant medicine  may help reduce or control OCD symptoms. Medicine is most effective when used with cognitive or behavioral therapy.  Treatment usually involves a combination of therapy and medicines. For severe OCD that does not respond to talk therapy and medicine, brain surgery or electrical stimulation of specific areas of the brain may be considered. Examples of electrical stimulation are:  · Deep brain stimulation (DBS).  · Transcranial magnetic stimulation (TMS).  · Transcranial direct current stimulation (tDCS).  Follow these instructions at home:    · Take over-the-counter and prescription medicines only as told by your health care provider. Do not start taking any new medicines unless your health care provider approves.  · Consider joining a support group for people with OCD. Visit www.shin.org to learn about support groups.  · Keep all follow-up visits as told by your health care provider. This is important.  Where to find more information  · International OCD Foundation: www.iocdf.org  · [employer] [place] [employer]ness (SHIN): www.shin.org  · Substance Abuse and Mental Health Services Administration (SAMHSA): www.samhsa.gov  Contact a health care provider if:  · You are not able to take your medicines as prescribed.  · Your symptoms get worse.  Get help right away if:  · You have thoughts of suicide or thoughts about hurting yourself or others.  If you ever feel like you may hurt yourself or others, or have thoughts about taking your own life, get help right away. Go to your nearest emergency department or:  · Call your local emergency services (654 in the [place]  · Call a suicide crisis helpline, such as the National Suicide Prevention Lifeline at 1-765.843.5250. This is open 24 hours a day in the [place]  · Text the Crisis Text Line at 613858 (in the [place]  Summary  · Obsessive-compulsive disorder (OCD) is a brain-based disorder. People with OCD have obsessions or compulsions, or both, and cannot control them.  · OCD  can interfere with personal relationships and normal activities at home, school, or work.  · Treatment usually involves a combination of therapy and medicines.  · Consider joining a support group for people with OCD.  This information is not intended to replace advice given to you by your health care provider. Make sure you discuss any questions you have with your health care provider.  Document Revised: 10/01/2020 Document Reviewed: 10/01/2020  [employer] Patient Education © 2021 [employer]

## 2022-02-24 DIAGNOSIS — F42.2 MIXED OBSESSIONAL THOUGHTS AND ACTS: Chronic | ICD-10-CM

## 2022-02-24 DIAGNOSIS — F95.2 TOURETTE'S SYNDROME: Chronic | ICD-10-CM

## 2022-02-25 RX ORDER — CLONIDINE HYDROCHLORIDE 0.1 MG/1
TABLET ORAL
Qty: 60 TABLET | Refills: 5 | Status: SHIPPED | OUTPATIENT
Start: 2022-02-25 | End: 2022-08-17

## 2022-02-25 RX ORDER — FLUVOXAMINE MALEATE 25 MG
25 TABLET ORAL NIGHTLY
Qty: 90 TABLET | Refills: 1 | Status: SHIPPED | OUTPATIENT
Start: 2022-02-25 | End: 2022-04-15 | Stop reason: SDUPTHER

## 2022-04-15 ENCOUNTER — TELEMEDICINE (OUTPATIENT)
Dept: PSYCHIATRY | Facility: CLINIC | Age: 22
End: 2022-04-15

## 2022-04-15 DIAGNOSIS — F95.2 TOURETTE'S SYNDROME: Primary | Chronic | ICD-10-CM

## 2022-04-15 DIAGNOSIS — F42.9 OBSESSIVE-COMPULSIVE DISORDER, UNSPECIFIED TYPE: Chronic | ICD-10-CM

## 2022-04-15 DIAGNOSIS — F42.2 MIXED OBSESSIONAL THOUGHTS AND ACTS: Chronic | ICD-10-CM

## 2022-04-15 PROCEDURE — 99214 OFFICE O/P EST MOD 30 MIN: CPT | Performed by: PSYCHIATRY & NEUROLOGY

## 2022-04-15 RX ORDER — CLONIDINE HYDROCHLORIDE 0.2 MG/1
0.2 TABLET ORAL 2 TIMES DAILY
Qty: 60 TABLET | Refills: 3 | Status: SHIPPED | OUTPATIENT
Start: 2022-04-15 | End: 2022-05-11

## 2022-04-15 RX ORDER — FLUVOXAMINE MALEATE 50 MG/1
50 TABLET, COATED ORAL NIGHTLY
Qty: 30 TABLET | Refills: 3 | Status: SHIPPED | OUTPATIENT
Start: 2022-04-15 | End: 2022-05-11 | Stop reason: SDUPTHER

## 2022-04-15 NOTE — PROGRESS NOTES
"Subjective   Domenic Sandoval is a 22 y.o. male who presents today for follow up via my chart video   This provider is located at [facility], Behavioral [facility], [address] [place] [address] [place],using a secure MyChart Video Visit through [employer]. Patient is being seen remotely via telehealth at their home address in [place] , and stated they are in a secure environment for this session. The patient's condition being diagnosed/treated is appropriate for telemedicine. The provider identified herself as well as her credentials. The patient, and/or patients guardian, consent to be seen remotely, and when consent is given they understand that the consent allows for patient identifiable information to be sent to a third party as needed. They may refuse to be seen remotely at any time. The electronic data is encrypted and password protected, and the patient and/or guardian has been advised of the potential risks to privacy not withstanding such measures.    Chief Complaint:   Decreased concentration, vocal tics     History of Present Illness: the pt started having involuntary movements, jerking movements of his arms and vocal tics (\"breakfast \" in funny childish voice, sometimes he is saying more complex sentences) , no stressors at that time. The pt goes to college  But has troubles with concentration  And difficult to complete tasks.  The pt reported issues with concentration since he remembers himself, when he had to do school work, chores at home , unable to stay focused at school, daydreaming , his grades dropped and failed few classes at college.   Now off school   Mood - anxious, getting nervous more often, but denied feeling depressed/hopeless/helpless   Denied AVH/SI/HI   + thought that he does not think they are his own , thoughts about parents having sex, those thoughts are very disturbing , unpleasant but he can not get rid of them   The pt does have few friends but prefers to play video games, " avoids in person  interactions     Today the pt reported feeling better, ticking less, no changes in mood, still isolative ot his room, does not work , the pt was seen via video chart, he was pacing in his room during entire visit   Sleep - fair,   E level decreased   Denied AVH/SI/HI     The following portions of the patient's history were reviewed and updated as appropriate: allergies, current medications, past family history, past medical history, past social history, past surgical history and problem list.    PAST PSYCHIATRIC HISTORY  Axis I  Affective/Bipolar Disorder, Anxiety/Panic Disorder  [facility] inpt x1, SA - by cutting wrists , no sutures required   Axis II  Defer     PAST OUTPATIENT TREATMENT  Diagnosis treated:  Affective Disorder, Anxiety/Panic Disorder  Therapist once , did not go back   Treatment Type:  Medication Management  Prior Psychiatric Medications:  Folates  Risperidone now for 1 year - effective     Support Groups:  None   Sequelae Of Mental Disorder:  job disruption, social isolation, emotional distress          Interval History  Some improvement     Side Effects  Risperidone - weight gain       Past Medical History:  Past Medical History:   Diagnosis Date   • Anxiety        Social History:  Social History     Socioeconomic History   • Marital status: Single   Tobacco Use   • Smoking status: Never Smoker   • Smokeless tobacco: Never Used   Vaping Use   • Vaping Use: Never used   Substance and Sexual Activity   • Alcohol use: Yes     Comment: 1- 2 per year    • Drug use: Never   • Sexual activity: Defer       Family History:  Family History   Problem Relation Age of Onset   • Hypertension Father    • Other Father         epilepsy   • Anxiety disorder Father    • Depression Father    • Heart disease Maternal Grandfather        Past Surgical History:  No past surgical history on file.    Problem List:  Patient Active Problem List   Diagnosis   • Ingrown nail of fifth toe of left foot   •  Tick bite of toe of left foot with infection   • Tourette's syndrome   • Twitching   • Generalized abdominal pain   • Hematuria   • Obsessive compulsive disorder       Allergy:   Allergies   Allergen Reactions   • Penicillins Swelling        Discontinued Medications:  There are no discontinued medications.    Current Medications:   Current Outpatient Medications   Medication Sig Dispense Refill   • cloNIDine (CATAPRES) 0.1 MG tablet TAKE 1 TABLET BY MOUTH TWICE A DAY 60 tablet 5   • fluvoxaMINE (LUVOX) 25 MG tablet Take 1 tablet by mouth Every Night. 90 tablet 1   • risperiDONE (risperDAL) 1 MG tablet Take 1 tablet by mouth 2 (Two) Times a Day. 180 tablet 2     No current facility-administered medications for this visit.         Psychological ROS: positive for - anxiety, concentration difficulties and involuntary movements , obsessive thoughts , vocal ticks   negative for - depression, disorientation, hallucinations, hostility, irritability or mood swings      Physical Exam:   There were no vitals taken for this visit.    Mental Status Exam:   Hygiene:   good  Cooperation:  Cooperative  Eye Contact:  limited   Psychomotor Behavior:  pacing   Affect:  Restricted  Mood: anxious  Hopelessness: Denies  Speech:  Monotone  Thought Process:  Goal directed and Linear  Thought Content:  Mood congruent, intrusive and obsessive thoughts with unpleasant content   Suicidal:  None  Homicidal:  None  Hallucinations:  None  Delusion:  None  Memory:  Intact  Orientation:  Person, Place, Time and Situation  Reliability:  fair  Insight:  Fair  Judgement:  Fair  Impulse Control:  Poor  Physical/Medical Issues:  No      MSE from 2/22/2022 reviewed and accepted with changes   PHQ-9 Depression Screening  Little interest or pleasure in doing things? 1-->several days   Feeling down, depressed, or hopeless? 1-->several days   Trouble falling or staying asleep, or sleeping too much? 2-->more than half the days   Feeling tired or having little  energy? 2-->more than half the days   Poor appetite or overeating? 0-->not at all   Feeling bad about yourself - or that you are a failure or have let yourself or your family down? 1-->several days   Trouble concentrating on things, such as reading the newspaper or watching television? 2-->more than half the days   Moving or speaking so slowly that other people could have noticed? Or the opposite - being so fidgety or restless that you have been moving around a lot more than usual? 0-->not at all   Thoughts that you would be better off dead, or of hurting yourself in some way? 0-->not at all   PHQ-9 Total Score 9   If you checked off any problems, how difficult have these problems made it for you to do your work, take care of things at home, or get along with other people? very difficult           Never smoker    I advised Domenic of the risks of tobacco use.     Lab Results:   No visits with results within 3 Month(s) from this visit.   Latest known visit with results is:   Office Visit on 12/20/2021   Component Date Value Ref Range Status   • Color 12/20/2021 Yellow  Yellow, Straw, Dark Yellow, Maude Final   • Clarity, UA 12/20/2021 Clear  Clear Final   • Specific Gravity  12/20/2021 1.010  1.005 - 1.030 Final   • pH, Urine 12/20/2021 5.0  5.0 - 8.0 Final   • Leukocytes 12/20/2021 Negative  Negative Final   • Nitrite, UA 12/20/2021 Negative  Negative Final   • Protein, POC 12/20/2021 Trace (A) Negative mg/dL Final   • Glucose, UA 12/20/2021 Negative  Negative, 1000 mg/dL (3+) mg/dL Final   • Ketones, UA 12/20/2021 Trace (A) Negative Final   • Urobilinogen, UA 12/20/2021 Normal  Normal Final   • Bilirubin 12/20/2021 Small (1+) (A) Negative Final   • Blood, UA 12/20/2021 Negative  Negative Final   • Lot Number 12/20/2021 na   Final   • Expiration Date 12/20/2021 na   Final       Assessment/Plan   Problems Addressed this Visit        Mental Health    Obsessive compulsive disorder (Chronic)    Tourette's syndrome -  Primary      Diagnoses       Codes Comments    Tourette's syndrome    -  Primary ICD-10-CM: F95.2  ICD-9-CM: 307.23     Obsessive-compulsive disorder, unspecified type     ICD-10-CM: F42.9  ICD-9-CM: 300.3     Mixed obsessional thoughts and acts     ICD-10-CM: F42.2  ICD-9-CM: 300.3           Visit Diagnoses:    ICD-10-CM ICD-9-CM   1. Tourette's syndrome  F95.2 307.23   2. Obsessive-compulsive disorder, unspecified type  F42.9 300.3   3. Mixed obsessional thoughts and acts  F42.2 300.3       TREATMENT PLAN/GOALS: Continue supportive psychotherapy efforts and medications as indicated. Treatment and medication options discussed during today's visit. Patient ackowledged and verbally consented to continue with current treatment plan and was educated on the importance of compliance with treatment and follow-up appointments.    MEDICATION ISSUES:  INSPECT reviewed as expected - no controlled meds   1. Tourette's syndrome - cont rispeirdone,  And increase clonidin to 0.2  mg BID , the pt noticed improvement after clonidine was added, tics were not as often   2. OCD- fluvoxamine , increase to 50  mg , will titrate up if tolerates   The pt needs neuropsychic assessment - referred to [facility] or ACP   For diagnostic clarifications     PHQ scored 9 and indicated mild depression     Discussed medication options and treatment plan of prescribed medication as well as the risks, benefits, and side effects including potential falls, possible impaired driving and metabolic adversities among others. Patient is agreeable to call the office with any worsening of symptoms or onset of side effects. Patient is agreeable to call 911 or go to the nearest ER should he/she begin having SI/HI. No medication side effects or related complaints today.     MEDS ORDERED DURING VISIT:  No orders of the defined types were placed in this encounter.      Return in about 4 months (around 8/15/2022).         This document has been electronically signed  by Eduarda Carter MD  April 15, 2022 08:25 EDT    Part of this note may be an electronic transcription/translation of spoken language to printed text using the Dragon Dictation System.

## 2022-05-11 ENCOUNTER — TELEPHONE (OUTPATIENT)
Dept: PSYCHIATRY | Facility: CLINIC | Age: 22
End: 2022-05-11

## 2022-05-11 DIAGNOSIS — F42.9 OBSESSIVE-COMPULSIVE DISORDER, UNSPECIFIED TYPE: Chronic | ICD-10-CM

## 2022-05-11 DIAGNOSIS — F95.2 TOURETTE'S SYNDROME: Chronic | ICD-10-CM

## 2022-05-11 RX ORDER — FLUVOXAMINE MALEATE 50 MG/1
50 TABLET, COATED ORAL NIGHTLY
Qty: 90 TABLET | Refills: 1 | Status: SHIPPED | OUTPATIENT
Start: 2022-05-11 | End: 2022-08-17

## 2022-05-11 RX ORDER — CLONIDINE HYDROCHLORIDE 0.2 MG/1
TABLET ORAL
Qty: 60 TABLET | Refills: 3 | Status: SHIPPED | OUTPATIENT
Start: 2022-05-11 | End: 2022-08-10

## 2022-05-31 ENCOUNTER — TELEPHONE (OUTPATIENT)
Dept: PSYCHIATRY | Facility: CLINIC | Age: 22
End: 2022-05-31

## 2022-07-27 ENCOUNTER — TELEPHONE (OUTPATIENT)
Dept: FAMILY MEDICINE CLINIC | Facility: CLINIC | Age: 22
End: 2022-07-27

## 2022-07-27 NOTE — TELEPHONE ENCOUNTER
THE PATIENT WILL NEED A DISABILITY FORM FILLED OUT AND SIGNED FOR [employer]. THE PATIENT WOULD LIKE TO KNOW HOW HE CAN GO ABOUT RECEIVING THIS ASAP. THIS NEEDS TO BE DONE BEFORE AUGUST 22ND. PLEASE ADVISE BY CALLING [phone].

## 2022-07-27 NOTE — TELEPHONE ENCOUNTER
Called & informed  is no longer practicing in this office. Patient will have to find a new doctor to have this form completed. Explained we have a new doctor coming the end of August & a new doctor coming the first of September but neither would be able to accommodate due to the time frame the form is needed.

## 2022-08-10 DIAGNOSIS — F95.2 TOURETTE'S SYNDROME: Chronic | ICD-10-CM

## 2022-08-10 RX ORDER — CLONIDINE HYDROCHLORIDE 0.2 MG/1
TABLET ORAL
Qty: 180 TABLET | Refills: 0 | Status: SHIPPED | OUTPATIENT
Start: 2022-08-10 | End: 2022-08-17 | Stop reason: SDUPTHER

## 2022-08-17 ENCOUNTER — OFFICE VISIT (OUTPATIENT)
Dept: PSYCHIATRY | Facility: CLINIC | Age: 22
End: 2022-08-17

## 2022-08-17 DIAGNOSIS — F95.2 TOURETTE'S SYNDROME: Chronic | ICD-10-CM

## 2022-08-17 DIAGNOSIS — F84.0 AUTISM: Primary | Chronic | ICD-10-CM

## 2022-08-17 DIAGNOSIS — F42.9 OBSESSIVE-COMPULSIVE DISORDER, UNSPECIFIED TYPE: Chronic | ICD-10-CM

## 2022-08-17 PROCEDURE — 99214 OFFICE O/P EST MOD 30 MIN: CPT | Performed by: PSYCHIATRY & NEUROLOGY

## 2022-08-17 RX ORDER — CLONIDINE HYDROCHLORIDE 0.2 MG/1
0.2 TABLET ORAL 2 TIMES DAILY
Qty: 180 TABLET | Refills: 1 | Status: SHIPPED | OUTPATIENT
Start: 2022-08-17 | End: 2023-01-24 | Stop reason: SDUPTHER

## 2022-08-17 RX ORDER — FLUVOXAMINE MALEATE 50 MG/1
75 TABLET, COATED ORAL NIGHTLY
Qty: 135 TABLET | Refills: 1 | Status: SHIPPED | OUTPATIENT
Start: 2022-08-17 | End: 2023-01-24 | Stop reason: SDUPTHER

## 2022-08-17 RX ORDER — ARIPIPRAZOLE 5 MG/1
5 TABLET ORAL DAILY
Qty: 30 TABLET | Refills: 3 | Status: SHIPPED | OUTPATIENT
Start: 2022-08-17 | End: 2022-09-09 | Stop reason: SDUPTHER

## 2022-08-17 NOTE — PROGRESS NOTES
"Subjective   Domenic Sandoval is a 22 y.o. male who presents today for follow up via      Chief Complaint:   intrusive thoughts are more intense     History of Present Illness: the pt started having involuntary movements, jerking movements of his arms and vocal tics (\"breakfast \" in funny childish voice, sometimes he is saying more complex sentences) , no stressors at that time. The pt goes to college  But has troubles with concentration  And difficult to complete tasks.  The pt reported issues with concentration since he remembers himself, when he had to do school work, chores at home , unable to stay focused at school, daydreaming , his grades dropped and failed few classes at college.   Now off school   Mood - anxious, getting nervous more often, but denied feeling depressed/hopeless/helpless   Denied AVH/SI/HI   + thought that he does not think they are his own , thoughts about parents having sex, those thoughts are very disturbing , unpleasant but he can not get rid of them   The pt does have few friends but prefers to play video games, avoids in person  interactions     Today the pt reported feeling better, ticking less after clonidine dose increase, no changes in mood,   Still has unpleasant intrusive thoughts with sex content      The pt remains  isolative ot his room, does not work , enrolled in school but doing online only    Sleep - fair,   E level decreased   Denied AVH/SI/HI   The pt presented today with his mom     The following portions of the patient's history were reviewed and updated as appropriate: allergies, current medications, past family history, past medical history, past social history, past surgical history and problem list.    PAST PSYCHIATRIC HISTORY  Axis I  Affective/Bipolar Disorder, Anxiety/Panic Disorder  [facility] in[facility] x1, SA - by cutting wrists , no sutures required   Axis II  Defer     PAST OUTPATIENT TREATMENT  Diagnosis treated:  Affective Disorder, Anxiety/Panic Disorder  Therapist " once , did not go back   Treatment Type:  Medication Management  Prior Psychiatric Medications:  Folates  Risperidone now for 1 year - effective     Support Groups:  None   Sequelae Of Mental Disorder:  job disruption, social isolation, emotional distress          Interval History  Some improvement     Side Effects  Risperidone - weight gain       Past Medical History:  Past Medical History:   Diagnosis Date   • Anxiety        Social History:  Social History     Socioeconomic History   • Marital status: Single   Tobacco Use   • Smoking status: Never Smoker   • Smokeless tobacco: Never Used   Vaping Use   • Vaping Use: Never used   Substance and Sexual Activity   • Alcohol use: Yes     Comment: 1- 2 per year    • Drug use: Never   • Sexual activity: Defer       Family History:  Family History   Problem Relation Age of Onset   • Hypertension Father    • Other Father         epilepsy   • Anxiety disorder Father    • Depression Father    • Heart disease Maternal Grandfather        Past Surgical History:  History reviewed. No pertinent surgical history.    Problem List:  Patient Active Problem List   Diagnosis   • Ingrown nail of fifth toe of left foot   • Tick bite of toe of left foot with infection   • Tourette's syndrome   • Twitching   • Generalized abdominal pain   • Hematuria   • Obsessive compulsive disorder   • Autism       Allergy:   Allergies   Allergen Reactions   • Penicillins Swelling        Discontinued Medications:  Medications Discontinued During This Encounter   Medication Reason   • cloNIDine (CATAPRES) 0.1 MG tablet *Therapy completed   • risperiDONE (risperDAL) 1 MG tablet *Therapy completed   • fluvoxaMINE (LUVOX) 50 MG tablet    • cloNIDine (CATAPRES) 0.2 MG tablet Reorder       Current Medications:   Current Outpatient Medications   Medication Sig Dispense Refill   • cloNIDine (CATAPRES) 0.2 MG tablet Take 1 tablet by mouth 2 (Two) Times a Day. 180 tablet 1   • fluvoxaMINE (LUVOX) 50 MG tablet Take  1.5 tablets by mouth Every Night. 135 tablet 1   • ARIPiprazole (ABILIFY) 5 MG tablet Take 1 tablet by mouth Daily. 30 tablet 3     No current facility-administered medications for this visit.         Psychological ROS: positive for - anxiety, concentration difficulties and involuntary movements , obsessive thoughts , vocal ticks   negative for - depression, disorientation, hallucinations, hostility, irritability or mood swings      Physical Exam:   There were no vitals taken for this visit.    Mental Status Exam:   Hygiene:   good  Cooperation:  Cooperative,    Eye Contact:  Poor  Psychomotor Behavior:  Appropriate  Affect:  Restricted  Mood: anxious  Hopelessness: Denies  Speech:  Monotone  Thought Process:  Goal directed and Linear  Thought Content:  Mood congruent, intrusive and obsessive thoughts with unpleasant content   Suicidal:  None  Homicidal:  None  Hallucinations:  None  Delusion:  None  Memory:  Intact  Orientation:  Person, Place, Time and Situation  Reliability:  fair  Insight:  Fair  Judgement:  Fair  Impulse Control:  Poor  Physical/Medical Issues:  No      MSE from 4/15/2022 reviewed and accepted with changes     PHQ-9 Depression Screening  Little interest or pleasure in doing things? 2-->more than half the days   Feeling down, depressed, or hopeless? 2-->more than half the days   Trouble falling or staying asleep, or sleeping too much? 0-->not at all   Feeling tired or having little energy? 2-->more than half the days   Poor appetite or overeating? 0-->not at all   Feeling bad about yourself - or that you are a failure or have let yourself or your family down? 2-->more than half the days   Trouble concentrating on things, such as reading the newspaper or watching television? 3-->nearly every day   Moving or speaking so slowly that other people could have noticed? Or the opposite - being so fidgety or restless that you have been moving around a lot more than usual? 1-->several days   Thoughts that  you would be better off dead, or of hurting yourself in some way? 0-->not at all   PHQ-9 Total Score 12   If you checked off any problems, how difficult have these problems made it for you to do your work, take care of things at home, or get along with other people? very difficult           Never smoker    I advised Domenic of the risks of tobacco use.     Lab Results:   No visits with results within 3 Month(s) from this visit.   Latest known visit with results is:   Office Visit on 12/20/2021   Component Date Value Ref Range Status   • Color 12/20/2021 Yellow  Yellow, Straw, Dark Yellow, Maude Final   • Clarity, UA 12/20/2021 Clear  Clear Final   • Specific Gravity  12/20/2021 1.010  1.005 - 1.030 Final   • pH, Urine 12/20/2021 5.0  5.0 - 8.0 Final   • Leukocytes 12/20/2021 Negative  Negative Final   • Nitrite, UA 12/20/2021 Negative  Negative Final   • Protein, POC 12/20/2021 Trace (A) Negative mg/dL Final   • Glucose, UA 12/20/2021 Negative  Negative, 1000 mg/dL (3+) mg/dL Final   • Ketones, UA 12/20/2021 Trace (A) Negative Final   • Urobilinogen, UA 12/20/2021 Normal  Normal Final   • Bilirubin 12/20/2021 Small (1+) (A) Negative Final   • Blood, UA 12/20/2021 Negative  Negative Final   • Lot Number 12/20/2021 na   Final   • Expiration Date 12/20/2021 na   Final       Assessment & Plan   Problems Addressed this Visit        Mental Health    Obsessive compulsive disorder (Chronic)    Relevant Medications    ARIPiprazole (ABILIFY) 5 MG tablet    fluvoxaMINE (LUVOX) 50 MG tablet    Tourette's syndrome    Relevant Medications    cloNIDine (CATAPRES) 0.2 MG tablet    ARIPiprazole (ABILIFY) 5 MG tablet    fluvoxaMINE (LUVOX) 50 MG tablet       Neuro    Autism - Primary (Chronic)    Relevant Medications    ARIPiprazole (ABILIFY) 5 MG tablet    fluvoxaMINE (LUVOX) 50 MG tablet      Diagnoses       Codes Comments    Autism    -  Primary ICD-10-CM: F84.0  ICD-9-CM: 299.00     Tourette's syndrome     ICD-10-CM:  F95.2  ICD-9-CM: 307.23     Obsessive-compulsive disorder, unspecified type     ICD-10-CM: F42.9  ICD-9-CM: 300.3           Visit Diagnoses:    ICD-10-CM ICD-9-CM   1. Autism  F84.0 299.00   2. Tourette's syndrome  F95.2 307.23   3. Obsessive-compulsive disorder, unspecified type  F42.9 300.3       TREATMENT PLAN/GOALS: Continue supportive psychotherapy efforts and medications as indicated. Treatment and medication options discussed during today's visit. Patient ackowledged and verbally consented to continue with current treatment plan and was educated on the importance of compliance with treatment and follow-up appointments.    MEDICATION ISSUES:  INSPECT reviewed as expected - no controlled meds     1. Tourette's syndrome - cross taper from  rispeirdone to abiliyf 5 mg po QHS  (better metabolic profile) ,   Cont clonidin  0.2  mg BID , the pt noticed improvement after clonidine was added, tics were not as often   2. OCD- fluvoxamine , increase to 75  mg , will titrate up if tolerates      3. Autism - trials of abilify 5 mg , d/c risperidone      PHQ scored 12 and indicated moderate  depression   DIANA 7 scored 9   The pt saw psychologist at [facility], was dsd with ADHD, OCD, autism     The pt will benefit from voc rehab  Autism support groups and services     Discussed medication options and treatment plan of prescribed medication as well as the risks, benefits, and side effects including potential falls, possible impaired driving and metabolic adversities among others. Patient is agreeable to call the office with any worsening of symptoms or onset of side effects. Patient is agreeable to call 911 or go to the nearest ER should he/she begin having SI/HI. No medication side effects or related complaints today.     MEDS ORDERED DURING VISIT:  New Medications Ordered This Visit   Medications   • cloNIDine (CATAPRES) 0.2 MG tablet     Sig: Take 1 tablet by mouth 2 (Two) Times a Day.     Dispense:  180 tablet     Refill:   1   • ARIPiprazole (ABILIFY) 5 MG tablet     Sig: Take 1 tablet by mouth Daily.     Dispense:  30 tablet     Refill:  3   • fluvoxaMINE (LUVOX) 50 MG tablet     Sig: Take 1.5 tablets by mouth Every Night.     Dispense:  135 tablet     Refill:  1       Return in about 3 months (around 11/17/2022).         This document has been electronically signed by Eduarda Carter MD  August 17, 2022 09:26 EDT    Part of this note may be an electronic transcription/translation of spoken language to printed text using the Dragon Dictation System.

## 2022-08-18 ENCOUNTER — TELEPHONE (OUTPATIENT)
Dept: PSYCHIATRY | Facility: CLINIC | Age: 22
End: 2022-08-18

## 2022-08-19 ENCOUNTER — TELEPHONE (OUTPATIENT)
Dept: PSYCHIATRY | Facility: CLINIC | Age: 22
End: 2022-08-19

## 2022-08-25 DIAGNOSIS — F42.2 MIXED OBSESSIONAL THOUGHTS AND ACTS: Chronic | ICD-10-CM

## 2022-08-25 DIAGNOSIS — F95.2 TOURETTE'S SYNDROME: Chronic | ICD-10-CM

## 2022-08-25 RX ORDER — CLONIDINE HYDROCHLORIDE 0.1 MG/1
TABLET ORAL
Qty: 180 TABLET | Refills: 1 | Status: SHIPPED | OUTPATIENT
Start: 2022-08-25 | End: 2023-01-24 | Stop reason: DRUGHIGH

## 2022-08-25 RX ORDER — FLUVOXAMINE MALEATE 25 MG
TABLET ORAL
Qty: 90 TABLET | Refills: 1 | OUTPATIENT
Start: 2022-08-25

## 2022-09-09 ENCOUNTER — TELEPHONE (OUTPATIENT)
Dept: PSYCHIATRY | Facility: CLINIC | Age: 22
End: 2022-09-09

## 2022-09-09 RX ORDER — ARIPIPRAZOLE 5 MG/1
5 TABLET ORAL DAILY
Qty: 90 TABLET | Refills: 0 | Status: SHIPPED | OUTPATIENT
Start: 2022-09-09 | End: 2022-12-09

## 2022-09-14 ENCOUNTER — TELEPHONE (OUTPATIENT)
Dept: PSYCHIATRY | Facility: CLINIC | Age: 22
End: 2022-09-14

## 2022-09-14 NOTE — TELEPHONE ENCOUNTER
Pt says he would like to stay on Abilify and try the hydroxyzine sent to [facility] in [place].

## 2022-09-14 NOTE — TELEPHONE ENCOUNTER
Pt and his mother called to say that ever since starting Abilify in the mornings, he has had a difficult time staying asleep at night.  He sleeps 3 hours and then wakes up.

## 2022-09-15 RX ORDER — HYDROXYZINE HYDROCHLORIDE 25 MG/1
25 TABLET, FILM COATED ORAL NIGHTLY PRN
Qty: 30 TABLET | Refills: 1 | Status: SHIPPED | OUTPATIENT
Start: 2022-09-15 | End: 2022-10-11

## 2022-10-11 RX ORDER — HYDROXYZINE HYDROCHLORIDE 25 MG/1
25 TABLET, FILM COATED ORAL NIGHTLY PRN
Qty: 30 TABLET | Refills: 1 | Status: SHIPPED | OUTPATIENT
Start: 2022-10-11 | End: 2022-12-13

## 2022-12-09 RX ORDER — ARIPIPRAZOLE 5 MG/1
TABLET ORAL
Qty: 90 TABLET | Refills: 0 | Status: SHIPPED | OUTPATIENT
Start: 2022-12-09 | End: 2023-01-24 | Stop reason: SDUPTHER

## 2022-12-12 NOTE — TELEPHONE ENCOUNTER
Rx Refill Note  Requested Prescriptions     Pending Prescriptions Disp Refills   • hydrOXYzine (ATARAX) 25 MG tablet [Pharmacy Med Name: HYDROXYZINE HCL 25 MG TABLET] 30 tablet 1     Sig: TAKE 1 TABLET BY MOUTH AT NIGHT AS NEEDED (INSOMNIA).      Last office visit with prescribing clinician: 8/17/2022   Last telemedicine visit with prescribing clinician: 1/24/2023   Next office visit with prescribing clinician: 1/24/2023   Office Visit with Eduarda Carter MD (08/17/2022)                        Would you like a call back once the refill request has been completed: [] Yes [] No    If the office needs to give you a call back, can they leave a voicemail: [] Yes [] No    Cherelle Mora MA  12/12/22, 08:35 EST

## 2022-12-13 RX ORDER — HYDROXYZINE HYDROCHLORIDE 25 MG/1
25 TABLET, FILM COATED ORAL NIGHTLY PRN
Qty: 30 TABLET | Refills: 1 | Status: SHIPPED | OUTPATIENT
Start: 2022-12-13 | End: 2023-01-18

## 2023-01-18 RX ORDER — HYDROXYZINE HYDROCHLORIDE 25 MG/1
25 TABLET, FILM COATED ORAL NIGHTLY PRN
Qty: 30 TABLET | Refills: 1 | Status: SHIPPED | OUTPATIENT
Start: 2023-01-18 | End: 2023-01-24 | Stop reason: SDUPTHER

## 2023-01-24 ENCOUNTER — OFFICE VISIT (OUTPATIENT)
Dept: PSYCHIATRY | Facility: CLINIC | Age: 23
End: 2023-01-24
Payer: COMMERCIAL

## 2023-01-24 DIAGNOSIS — F95.2 TOURETTE'S SYNDROME: ICD-10-CM

## 2023-01-24 DIAGNOSIS — F42.9 OBSESSIVE-COMPULSIVE DISORDER, UNSPECIFIED TYPE: Chronic | ICD-10-CM

## 2023-01-24 DIAGNOSIS — F84.0 AUTISM: Primary | Chronic | ICD-10-CM

## 2023-01-24 DIAGNOSIS — F90.0 ADHD, PREDOMINANTLY INATTENTIVE TYPE: Chronic | ICD-10-CM

## 2023-01-24 PROCEDURE — 99214 OFFICE O/P EST MOD 30 MIN: CPT | Performed by: PSYCHIATRY & NEUROLOGY

## 2023-01-24 RX ORDER — HYDROXYZINE HYDROCHLORIDE 25 MG/1
25 TABLET, FILM COATED ORAL NIGHTLY PRN
Qty: 90 TABLET | Refills: 1 | Status: SHIPPED | OUTPATIENT
Start: 2023-01-24

## 2023-01-24 RX ORDER — ATOMOXETINE 40 MG/1
40 CAPSULE ORAL DAILY
Qty: 5 CAPSULE | Refills: 0 | Status: SHIPPED | OUTPATIENT
Start: 2023-01-24

## 2023-01-24 RX ORDER — ATOMOXETINE 60 MG/1
60 CAPSULE ORAL DAILY
Qty: 30 CAPSULE | Refills: 2 | Status: SHIPPED | OUTPATIENT
Start: 2023-01-24 | End: 2023-02-24 | Stop reason: SDUPTHER

## 2023-01-24 RX ORDER — CLONIDINE HYDROCHLORIDE 0.2 MG/1
0.2 TABLET ORAL 2 TIMES DAILY
Qty: 180 TABLET | Refills: 1 | Status: SHIPPED | OUTPATIENT
Start: 2023-01-24

## 2023-01-24 RX ORDER — FLUVOXAMINE MALEATE 50 MG/1
75 TABLET, COATED ORAL NIGHTLY
Qty: 135 TABLET | Refills: 1 | Status: SHIPPED | OUTPATIENT
Start: 2023-01-24

## 2023-01-24 RX ORDER — ARIPIPRAZOLE 5 MG/1
5 TABLET ORAL DAILY
Qty: 90 TABLET | Refills: 1 | Status: SHIPPED | OUTPATIENT
Start: 2023-01-24

## 2023-01-24 NOTE — PROGRESS NOTES
"Subjective   Domenic Sandoval is a 23 y.o. male who presents today for follow up via      Chief Complaint:   Decreased concentration     History of Present Illness: the pt started having involuntary movements, jerking movements of his arms and vocal tics (\"breakfast \" in funny childish voice, sometimes he is saying more complex sentences) , no stressors at that time. The pt goes to college  But has troubles with concentration  And difficult to complete tasks.  The pt reported issues with concentration since he remembers himself, when he had to do school work, chores at home , unable to stay focused at school, daydreaming , his grades dropped and failed few classes at college.   Now off school   Mood - anxious, getting nervous more often, but denied feeling depressed/hopeless/helpless   Denied AVH/SI/HI   + thought that he does not think they are his own , thoughts about parents having sex, those thoughts are very disturbing , unpleasant but he can not get rid of them   The pt does have few friends but prefers to play video games, avoids in person  interactions     Today the pt reported feeling better, ticks are not as often, mood improved   Still has unpleasant intrusive thoughts with sex content but does not last long      The pt remains  isolative ot his room, does not work , enrolled in school but doing online only    Sleep - fair,   E level decreased   Denied AVH/SI/HI   The pt presented today with his mom     The pt c/o decreased concentration,  He is thinking about going back to school    The following portions of the patient's history were reviewed and updated as appropriate: allergies, current medications, past family history, past medical history, past social history, past surgical history and problem list.    PAST PSYCHIATRIC HISTORY  Axis I  Affective/Bipolar Disorder, Anxiety/Panic Disorder  solomon inpt x1, SA - by cutting wrists , no sutures required   Axis II  Defer     PAST OUTPATIENT " TREATMENT  Diagnosis treated:  Affective Disorder, Anxiety/Panic Disorder  Therapist once , did not go back   Treatment Type:  Medication Management  Prior Psychiatric Medications:  Folates  Risperidone now for 1 year - effective but gained weight     Support Groups:  None   Sequelae Of Mental Disorder:  job disruption, social isolation, emotional distress          Interval History  Some improvement     Side Effects  Denied       Past Medical History:  Past Medical History:   Diagnosis Date   • Anxiety        Social History:  Social History     Socioeconomic History   • Marital status: Single   Tobacco Use   • Smoking status: Never   • Smokeless tobacco: Never   Vaping Use   • Vaping Use: Never used   Substance and Sexual Activity   • Alcohol use: Yes     Comment: 1- 2 per year    • Drug use: Never   • Sexual activity: Defer       Family History:  Family History   Problem Relation Age of Onset   • Hypertension Father    • Other Father         epilepsy   • Anxiety disorder Father    • Depression Father    • Heart disease Maternal Grandfather        Past Surgical History:  No past surgical history on file.    Problem List:  Patient Active Problem List   Diagnosis   • Ingrown nail of fifth toe of left foot   • Tick bite of toe of left foot with infection   • Tourette's syndrome   • Twitching   • Generalized abdominal pain   • Hematuria   • Obsessive compulsive disorder   • Autism       Allergy:   Allergies   Allergen Reactions   • Penicillins Swelling        Discontinued Medications:  Medications Discontinued During This Encounter   Medication Reason   • cloNIDine (CATAPRES) 0.1 MG tablet Dose adjustment   • cloNIDine (CATAPRES) 0.2 MG tablet Reorder   • fluvoxaMINE (LUVOX) 50 MG tablet Reorder   • ARIPiprazole (ABILIFY) 5 MG tablet Reorder   • hydrOXYzine (ATARAX) 25 MG tablet Reorder       Current Medications:   Current Outpatient Medications   Medication Sig Dispense Refill   • ARIPiprazole (ABILIFY) 5 MG tablet  Take 1 tablet by mouth Daily. 90 tablet 1   • cloNIDine (CATAPRES) 0.2 MG tablet Take 1 tablet by mouth 2 (Two) Times a Day. 180 tablet 1   • fluvoxaMINE (LUVOX) 50 MG tablet Take 1.5 tablets by mouth Every Night. 135 tablet 1   • hydrOXYzine (ATARAX) 25 MG tablet Take 1 tablet by mouth At Night As Needed (insomnia). 90 tablet 1   • atomoxetine (Strattera) 40 MG capsule Take 1 capsule by mouth Daily. 5 capsule 0   • atomoxetine (STRATTERA) 60 MG capsule Take 1 capsule by mouth Daily. 30 capsule 2     No current facility-administered medications for this visit.         Psychological ROS: positive for - anxiety, concentration difficulties and involuntary movements , obsessive thoughts , vocal ticks   negative for - depression, disorientation, hallucinations, hostility, irritability or mood swings      Physical Exam:   There were no vitals taken for this visit.    Mental Status Exam:   Hygiene:   good  Cooperation:  Cooperative,    Eye Contact:  Poor  Psychomotor Behavior:  Appropriate  Affect:  Blunted  Mood: fluctates  Hopelessness: Denies  Speech:  Monotone  Thought Process:  Goal directed and Linear  Thought Content:  Mood congruent, intrusive and obsessive thoughts with unpleasant content   Suicidal:  None  Homicidal:  None  Hallucinations:  None  Delusion:  None  Memory:  Intact  Orientation:  Person, Place, Time and Situation  Reliability:  fair  Insight:  Fair  Judgement:  Fair  Impulse Control:  Poor  Physical/Medical Issues:  No      MSE from 8/17/2022 reviewed and accepted with changes     PHQ-9 Depression Screening  Little interest or pleasure in doing things? 1-->several days   Feeling down, depressed, or hopeless? 1-->several days   Trouble falling or staying asleep, or sleeping too much? 0-->not at all   Feeling tired or having little energy? 0-->not at all   Poor appetite or overeating? 0-->not at all   Feeling bad about yourself - or that you are a failure or have let yourself or your family down?  0-->not at all   Trouble concentrating on things, such as reading the newspaper or watching television? 2-->more than half the days   Moving or speaking so slowly that other people could have noticed? Or the opposite - being so fidgety or restless that you have been moving around a lot more than usual? 0-->not at all   Thoughts that you would be better off dead, or of hurting yourself in some way? 0-->not at all   PHQ-9 Total Score 4   If you checked off any problems, how difficult have these problems made it for you to do your work, take care of things at home, or get along with other people? somewhat difficult           Never smoker    I advised Domenic of the risks of tobacco use.     Lab Results:   No visits with results within 3 Month(s) from this visit.   Latest known visit with results is:   Office Visit on 12/20/2021   Component Date Value Ref Range Status   • Color 12/20/2021 Yellow  Yellow, Straw, Dark Yellow, Maude Final   • Clarity, UA 12/20/2021 Clear  Clear Final   • Specific Gravity  12/20/2021 1.010  1.005 - 1.030 Final   • pH, Urine 12/20/2021 5.0  5.0 - 8.0 Final   • Leukocytes 12/20/2021 Negative  Negative Final   • Nitrite, UA 12/20/2021 Negative  Negative Final   • Protein, POC 12/20/2021 Trace (A)  Negative mg/dL Final   • Glucose, UA 12/20/2021 Negative  Negative, 1000 mg/dL (3+) mg/dL Final   • Ketones, UA 12/20/2021 Trace (A)  Negative Final   • Urobilinogen, UA 12/20/2021 Normal  Normal Final   • Bilirubin 12/20/2021 Small (1+) (A)  Negative Final   • Blood, UA 12/20/2021 Negative  Negative Final   • Lot Number 12/20/2021 na   Final   • Expiration Date 12/20/2021 na   Final       Assessment & Plan   Problems Addressed this Visit        Mental Health    Obsessive compulsive disorder (Chronic)    Relevant Medications    ARIPiprazole (ABILIFY) 5 MG tablet    fluvoxaMINE (LUVOX) 50 MG tablet    hydrOXYzine (ATARAX) 25 MG tablet    atomoxetine (Strattera) 40 MG capsule    atomoxetine (STRATTERA)  60 MG capsule    Tourette's syndrome    Relevant Medications    ARIPiprazole (ABILIFY) 5 MG tablet    fluvoxaMINE (LUVOX) 50 MG tablet    cloNIDine (CATAPRES) 0.2 MG tablet    hydrOXYzine (ATARAX) 25 MG tablet    atomoxetine (Strattera) 40 MG capsule    atomoxetine (STRATTERA) 60 MG capsule       Neuro    Autism - Primary (Chronic)    Relevant Medications    ARIPiprazole (ABILIFY) 5 MG tablet    fluvoxaMINE (LUVOX) 50 MG tablet    hydrOXYzine (ATARAX) 25 MG tablet    atomoxetine (Strattera) 40 MG capsule    atomoxetine (STRATTERA) 60 MG capsule   Other Visit Diagnoses     ADHD, predominantly inattentive type        Relevant Medications    ARIPiprazole (ABILIFY) 5 MG tablet    fluvoxaMINE (LUVOX) 50 MG tablet    hydrOXYzine (ATARAX) 25 MG tablet    atomoxetine (Strattera) 40 MG capsule    atomoxetine (STRATTERA) 60 MG capsule      Diagnoses       Codes Comments    Autism    -  Primary ICD-10-CM: F84.0  ICD-9-CM: 299.00     Tourette's syndrome     ICD-10-CM: F95.2  ICD-9-CM: 307.23     Obsessive-compulsive disorder, unspecified type     ICD-10-CM: F42.9  ICD-9-CM: 300.3     ADHD, predominantly inattentive type     ICD-10-CM: F90.0  ICD-9-CM: 314.00           Visit Diagnoses:    ICD-10-CM ICD-9-CM   1. Autism  F84.0 299.00   2. Tourette's syndrome  F95.2 307.23   3. Obsessive-compulsive disorder, unspecified type  F42.9 300.3   4. ADHD, predominantly inattentive type  F90.0 314.00       TREATMENT PLAN/GOALS: Continue supportive psychotherapy efforts and medications as indicated. Treatment and medication options discussed during today's visit. Patient ackowledged and verbally consented to continue with current treatment plan and was educated on the importance of compliance with treatment and follow-up appointments.    MEDICATION ISSUES:  INSPECT reviewed as expected - no controlled meds     1. Tourette's syndrome - cont abilify 5- 2.5  mg po QAM ,   Cont clonidine  0.2  mg BID ,    2. OCD- cont fluvoxamine  75  mg ,  will titrate up if tolerates      3. Autism -  abilify 2.5 -5 mg  - tolerates well and reported efficacy       4. ADHD - trials of strattera 40-60 mg po QAM , can be increased to 80 mg if tolerates     PHQ scored 4 and indicated moderate  depression   DIANA 7 scored 8     The pt saw psychologist at [facility], was dsd with ADHD, OCD, autism     The pt will benefit from voc rehab  Autism support groups and services     Discussed medication options and treatment plan of prescribed medication as well as the risks, benefits, and side effects including potential falls, possible impaired driving and metabolic adversities among others. Patient is agreeable to call the office with any worsening of symptoms or onset of side effects. Patient is agreeable to call 911 or go to the nearest ER should he/she begin having SI/HI. No medication side effects or related complaints today.     MEDS ORDERED DURING VISIT:  New Medications Ordered This Visit   Medications   • ARIPiprazole (ABILIFY) 5 MG tablet     Sig: Take 1 tablet by mouth Daily.     Dispense:  90 tablet     Refill:  1   • fluvoxaMINE (LUVOX) 50 MG tablet     Sig: Take 1.5 tablets by mouth Every Night.     Dispense:  135 tablet     Refill:  1   • cloNIDine (CATAPRES) 0.2 MG tablet     Sig: Take 1 tablet by mouth 2 (Two) Times a Day.     Dispense:  180 tablet     Refill:  1   • hydrOXYzine (ATARAX) 25 MG tablet     Sig: Take 1 tablet by mouth At Night As Needed (insomnia).     Dispense:  90 tablet     Refill:  1   • atomoxetine (Strattera) 40 MG capsule     Sig: Take 1 capsule by mouth Daily.     Dispense:  5 capsule     Refill:  0     strattera 40 mg po QAM for 5 days , then increase to 60 mg (separate rx was sent to the pharmacy)   • atomoxetine (STRATTERA) 60 MG capsule     Sig: Take 1 capsule by mouth Daily.     Dispense:  30 capsule     Refill:  2       Return in about 6 months (around 7/24/2023).         This document has been electronically signed by Eduarda Carter,  MD  January 24, 2023 08:23 EST    Part of this note may be an electronic transcription/translation of spoken language to printed text using the Dragon Dictation System.

## 2023-01-25 ENCOUNTER — PRIOR AUTHORIZATION (OUTPATIENT)
Dept: PSYCHIATRY | Facility: CLINIC | Age: 23
End: 2023-01-25
Payer: COMMERCIAL

## 2023-01-25 NOTE — TELEPHONE ENCOUNTER
PA for Strattera 60 mg capsules 30 for 30 days submitted;    Your PA request has been closed. ATOMOXETINE 60MG CAP #30/30 days // Mock Claim Paid // F90.0-Attention-deficit hyperactivity disorder, predominantly inattentive ty // No PA Required Test Claim Paid Today and 90 days out Duplicate Request - WIL, Technician 01/31/2023 03:25 PM

## 2023-02-24 ENCOUNTER — TELEPHONE (OUTPATIENT)
Dept: PSYCHIATRY | Facility: CLINIC | Age: 23
End: 2023-02-24
Payer: COMMERCIAL

## 2023-02-24 DIAGNOSIS — F90.0 ADHD, PREDOMINANTLY INATTENTIVE TYPE: Chronic | ICD-10-CM

## 2023-02-24 RX ORDER — ATOMOXETINE 60 MG/1
60 CAPSULE ORAL DAILY
Qty: 90 CAPSULE | Refills: 1 | Status: SHIPPED | OUTPATIENT
Start: 2023-02-24 | End: 2024-02-24

## 2023-02-24 NOTE — TELEPHONE ENCOUNTER
Fax from [facility] asking for 90 day supply of atomoxetine hcl 60 mg capsules.

## 2023-07-26 DIAGNOSIS — F42.9 OBSESSIVE-COMPULSIVE DISORDER, UNSPECIFIED TYPE: Chronic | ICD-10-CM

## 2023-07-26 RX ORDER — ARIPIPRAZOLE 5 MG/1
TABLET ORAL
Qty: 90 TABLET | Refills: 0 | Status: SHIPPED | OUTPATIENT
Start: 2023-07-26

## 2023-07-26 RX ORDER — FLUVOXAMINE MALEATE 50 MG/1
TABLET, COATED ORAL
Qty: 135 TABLET | Refills: 0 | Status: SHIPPED | OUTPATIENT
Start: 2023-07-26

## 2023-08-02 ENCOUNTER — OFFICE VISIT (OUTPATIENT)
Dept: PSYCHIATRY | Facility: CLINIC | Age: 23
End: 2023-08-02
Payer: COMMERCIAL

## 2023-08-02 DIAGNOSIS — F95.2 TOURETTE'S SYNDROME: ICD-10-CM

## 2023-08-02 DIAGNOSIS — F42.9 OBSESSIVE-COMPULSIVE DISORDER, UNSPECIFIED TYPE: Chronic | ICD-10-CM

## 2023-08-02 DIAGNOSIS — F84.0 AUTISM: Primary | Chronic | ICD-10-CM

## 2023-08-02 DIAGNOSIS — F90.0 ADHD, PREDOMINANTLY INATTENTIVE TYPE: Chronic | ICD-10-CM

## 2023-08-02 RX ORDER — FLUVOXAMINE MALEATE 50 MG/1
75 TABLET, COATED ORAL NIGHTLY
Qty: 135 TABLET | Refills: 1 | Status: SHIPPED | OUTPATIENT
Start: 2023-08-02

## 2023-08-02 RX ORDER — ARIPIPRAZOLE 5 MG/1
5 TABLET ORAL DAILY
Qty: 90 TABLET | Refills: 1 | Status: SHIPPED | OUTPATIENT
Start: 2023-08-02

## 2023-08-02 RX ORDER — ATOMOXETINE 80 MG/1
80 CAPSULE ORAL DAILY
Qty: 90 CAPSULE | Refills: 1 | Status: SHIPPED | OUTPATIENT
Start: 2023-08-02 | End: 2024-08-01

## 2023-08-02 RX ORDER — CLONIDINE HYDROCHLORIDE 0.2 MG/1
0.2 TABLET ORAL 2 TIMES DAILY
Qty: 180 TABLET | Refills: 1 | Status: SHIPPED | OUTPATIENT
Start: 2023-08-02

## 2023-08-16 RX ORDER — HYDROXYZINE HYDROCHLORIDE 25 MG/1
25 TABLET, FILM COATED ORAL NIGHTLY PRN
Qty: 90 TABLET | Refills: 1 | Status: SHIPPED | OUTPATIENT
Start: 2023-08-16

## 2023-09-03 DIAGNOSIS — F90.0 ADHD, PREDOMINANTLY INATTENTIVE TYPE: Chronic | ICD-10-CM

## 2023-09-05 RX ORDER — ATOMOXETINE 60 MG/1
CAPSULE ORAL
Qty: 90 CAPSULE | Refills: 1 | OUTPATIENT
Start: 2023-09-05

## 2024-01-30 DIAGNOSIS — F90.0 ADHD, PREDOMINANTLY INATTENTIVE TYPE: Chronic | ICD-10-CM

## 2024-01-30 DIAGNOSIS — F95.2 TOURETTE'S SYNDROME: ICD-10-CM

## 2024-01-30 RX ORDER — CLONIDINE HYDROCHLORIDE 0.2 MG/1
0.2 TABLET ORAL 2 TIMES DAILY
Qty: 180 TABLET | Refills: 0 | Status: SHIPPED | OUTPATIENT
Start: 2024-01-30 | End: 2024-02-02 | Stop reason: SDUPTHER

## 2024-01-30 RX ORDER — ATOMOXETINE 80 MG/1
80 CAPSULE ORAL DAILY
Qty: 90 CAPSULE | Refills: 0 | Status: SHIPPED | OUTPATIENT
Start: 2024-01-30 | End: 2024-02-02 | Stop reason: SDUPTHER

## 2024-01-30 NOTE — TELEPHONE ENCOUNTER
Rx Refill Note  Requested Prescriptions     Pending Prescriptions Disp Refills    cloNIDine (CATAPRES) 0.2 MG tablet [Pharmacy Med Name: CLONIDINE HCL 0.2 MG TABLET] 180 tablet 1     Sig: TAKE 1 TABLET BY MOUTH TWICE A DAY    atomoxetine (STRATTERA) 80 MG capsule [Pharmacy Med Name: ATOMOXETINE HCL 80 MG CAPSULE] 90 capsule 1     Sig: TAKE 1 CAPSULE BY MOUTH EVERY DAY      Last office visit with prescribing clinician: 8/2/2023   Last telemedicine visit with prescribing clinician: Visit date not found   Next office visit with prescribing clinician: 2/2/2024   Office Visit with Eduarda Carter MD (08/02/2023)                       Would you like a call back once the refill request has been completed: [] Yes [] No    If the office needs to give you a call back, can they leave a voicemail: [] Yes [] No    Cherelle Mora MA  01/30/24, 09:06 EST

## 2024-02-02 ENCOUNTER — OFFICE VISIT (OUTPATIENT)
Dept: PSYCHIATRY | Facility: CLINIC | Age: 24
End: 2024-02-02
Payer: COMMERCIAL

## 2024-02-02 VITALS — OXYGEN SATURATION: 98 % | SYSTOLIC BLOOD PRESSURE: 128 MMHG | DIASTOLIC BLOOD PRESSURE: 91 MMHG | HEART RATE: 135 BPM

## 2024-02-02 DIAGNOSIS — F84.0 AUTISM: Primary | Chronic | ICD-10-CM

## 2024-02-02 DIAGNOSIS — F42.9 OBSESSIVE-COMPULSIVE DISORDER, UNSPECIFIED TYPE: Chronic | ICD-10-CM

## 2024-02-02 DIAGNOSIS — F90.0 ADHD, PREDOMINANTLY INATTENTIVE TYPE: Chronic | ICD-10-CM

## 2024-02-02 DIAGNOSIS — F95.2 TOURETTE'S SYNDROME: ICD-10-CM

## 2024-02-02 RX ORDER — ARIPIPRAZOLE 5 MG/1
5 TABLET ORAL NIGHTLY
Qty: 90 TABLET | Refills: 1 | Status: SHIPPED | OUTPATIENT
Start: 2024-02-02

## 2024-02-02 RX ORDER — FLUVOXAMINE MALEATE 50 MG/1
75 TABLET, COATED ORAL NIGHTLY
Qty: 135 TABLET | Refills: 1 | Status: SHIPPED | OUTPATIENT
Start: 2024-02-02

## 2024-02-02 RX ORDER — ATOMOXETINE 80 MG/1
80 CAPSULE ORAL DAILY
Qty: 90 CAPSULE | Refills: 0 | Status: SHIPPED | OUTPATIENT
Start: 2024-02-02

## 2024-02-02 RX ORDER — CLONIDINE HYDROCHLORIDE 0.2 MG/1
0.2 TABLET ORAL 2 TIMES DAILY
Qty: 180 TABLET | Refills: 0 | Status: SHIPPED | OUTPATIENT
Start: 2024-02-02

## 2024-02-02 NOTE — PROGRESS NOTES
"Subjective   Domenic Sandoval is a 24 y.o. male who presents today for follow up     Chief Complaint:   Decreased concentration, increased fatigue, episodes of somnolence      History of Present Illness: the pt started having involuntary movements, jerking movements of his arms and vocal tics (\"breakfast \" in funny childish voice, sometimes he is saying more complex sentences) , no stressors at that time. The pt goes to college  But has troubles with concentration  And difficult to complete tasks.  The pt reported issues with concentration since he remembers himself, when he had to do school work, chores at home , unable to stay focused at school, daydreaming , his grades dropped and failed few classes at college.   Now off school   Mood - anxious, getting nervous more often, but denied feeling depressed/hopeless/helpless   Denied AVH/SI/HI   + thought that he does not think they are his own , thoughts about parents having sex, those thoughts are very disturbing , unpleasant but he can not get rid of them   The pt does have few friends but prefers to play video games, avoids in person  interactions     Today the pt reported feeling better, ticks are not as often, mood improved , however, the pt and his mom reported episodes with sudden onset of sleep and losing muscle  tonus   No anger outbursts  Still has unpleasant intrusive thoughts     Concentration - still decreased   The pt remains  isolative to his room, does not work , enrolled in school but doing online only, plays board games     Sleep - hypersomnia but not restorative and sudden onset or sleep   E level decreased   Denied AVH/SI/HI   The pt presented today with his mom        The following portions of the patient's history were reviewed and updated as appropriate: allergies, current medications, past family history, past medical history, past social history, past surgical history and problem list.    PAST PSYCHIATRIC HISTORY  Axis I  Affective/Bipolar " Disorder, Anxiety/Panic Disorder  solomon inpt x1, SA - by cutting wrists , no sutures required   Axis II  Defer     PAST OUTPATIENT TREATMENT  Diagnosis treated:  Affective Disorder, Anxiety/Panic Disorder  Therapist once , did not go back   Treatment Type:  Medication Management  Prior Psychiatric Medications:  Folates  Risperidone now for 1 year - effective but gained weight     Support Groups:  None   Sequelae Of Mental Disorder:  job disruption, social isolation, emotional distress          Interval History  No changes     Side Effects  Fatigue ? Side effect ?       Past Medical History:  Past Medical History:   Diagnosis Date    Anxiety        Social History:  Social History     Socioeconomic History    Marital status: Single   Tobacco Use    Smoking status: Never    Smokeless tobacco: Never   Vaping Use    Vaping Use: Never used   Substance and Sexual Activity    Alcohol use: Yes     Comment: 1- 2 per year     Drug use: Never    Sexual activity: Defer       Family History:  Family History   Problem Relation Age of Onset    Hypertension Father     Other Father         epilepsy    Anxiety disorder Father     Depression Father     Heart disease Maternal Grandfather        Past Surgical History:  No past surgical history on file.    Problem List:  Patient Active Problem List   Diagnosis    Ingrown nail of fifth toe of left foot    Tick bite of toe of left foot with infection    Tourette's syndrome    Twitching    Generalized abdominal pain    Hematuria    Obsessive compulsive disorder    Autism       Allergy:   Allergies   Allergen Reactions    Penicillins Swelling        Discontinued Medications:  Medications Discontinued During This Encounter   Medication Reason    ARIPiprazole (ABILIFY) 5 MG tablet Reorder    fluvoxaMINE (LUVOX) 50 MG tablet Reorder    cloNIDine (CATAPRES) 0.2 MG tablet Reorder    atomoxetine (STRATTERA) 80 MG capsule Reorder           Current Medications:   Current Outpatient Medications    Medication Sig Dispense Refill    ARIPiprazole (ABILIFY) 5 MG tablet Take 1 tablet by mouth Every Night. 90 tablet 1    atomoxetine (STRATTERA) 80 MG capsule Take 1 capsule by mouth Daily. 90 capsule 0    cloNIDine (CATAPRES) 0.2 MG tablet Take 1 tablet by mouth 2 (Two) Times a Day. 180 tablet 0    fluvoxaMINE (LUVOX) 50 MG tablet Take 1.5 tablets by mouth Every Night. 135 tablet 1    hydrOXYzine (ATARAX) 25 MG tablet TAKE 1 TABLET BY MOUTH AT NIGHT AS NEEDED (INSOMNIA). 90 tablet 1     No current facility-administered medications for this visit.         Psychological ROS: positive for - anxiety, concentration difficulties and involuntary movements , obsessive thoughts , vocal ticks   negative for - depression, disorientation, hallucinations, hostility, irritability or mood swings      Physical Exam:   Blood pressure 128/91, pulse (!) 135, SpO2 98%.    Mental Status Exam:   Hygiene:   good  Cooperation:  Cooperative,    Eye Contact:  Poor  Psychomotor Behavior:  Appropriate  Affect:  Blunted  Mood: fluctates  Hopelessness: Denies  Speech:  Monotone vocal tics  Thought Process:  Goal directed and Linear  Thought Content:  Mood congruent, intrusive and obsessive thoughts with unpleasant content   Suicidal:  None  Homicidal:  None  Hallucinations:  None  Delusion:  None  Memory:  Intact  Orientation:  Person, Place, Time and Situation  Reliability:  fair  Insight:  Fair  Judgement:  Fair  Impulse Control:  Poor  Physical/Medical Issues:  No      MSE from 8/2/23  reviewed and accepted without changes     PHQ-9 Depression Screening  Little interest or pleasure in doing things? 1-->several days   Feeling down, depressed, or hopeless? 1-->several days   Trouble falling or staying asleep, or sleeping too much? 0-->not at all   Feeling tired or having little energy? 3-->nearly every day   Poor appetite or overeating? 0-->not at all   Feeling bad about yourself - or that you are a failure or have let yourself or your family  down? 2-->more than half the days   Trouble concentrating on things, such as reading the newspaper or watching television? 3-->nearly every day   Moving or speaking so slowly that other people could have noticed? Or the opposite - being so fidgety or restless that you have been moving around a lot more than usual? 0-->not at all   Thoughts that you would be better off dead, or of hurting yourself in some way? 0-->not at all   PHQ-9 Total Score 10   If you checked off any problems, how difficult have these problems made it for you to do your work, take care of things at home, or get along with other people? very difficult           Never smoker    I advised Domenic of the risks of tobacco use.     Lab Results:   No visits with results within 3 Month(s) from this visit.   Latest known visit with results is:   Office Visit on 12/20/2021   Component Date Value Ref Range Status    Color 12/20/2021 Yellow  Yellow, Straw, Dark Yellow, Maude Final    Clarity, UA 12/20/2021 Clear  Clear Final    Specific Gravity  12/20/2021 1.010  1.005 - 1.030 Final    pH, Urine 12/20/2021 5.0  5.0 - 8.0 Final    Leukocytes 12/20/2021 Negative  Negative Final    Nitrite, UA 12/20/2021 Negative  Negative Final    Protein, POC 12/20/2021 Trace (A)  Negative mg/dL Final    Glucose, UA 12/20/2021 Negative  Negative, 1000 mg/dL (3+) mg/dL Final    Ketones, UA 12/20/2021 Trace (A)  Negative Final    Urobilinogen, UA 12/20/2021 Normal  Normal Final    Bilirubin 12/20/2021 Small (1+) (A)  Negative Final    Blood, UA 12/20/2021 Negative  Negative Final    Lot Number 12/20/2021 na   Final    Expiration Date 12/20/2021 na   Final       Assessment & Plan   Problems Addressed this Visit       Tourette's syndrome    Relevant Medications    ARIPiprazole (ABILIFY) 5 MG tablet    fluvoxaMINE (LUVOX) 50 MG tablet    cloNIDine (CATAPRES) 0.2 MG tablet    atomoxetine (STRATTERA) 80 MG capsule    Obsessive compulsive disorder (Chronic)    Relevant Medications     ARIPiprazole (ABILIFY) 5 MG tablet    fluvoxaMINE (LUVOX) 50 MG tablet    atomoxetine (STRATTERA) 80 MG capsule    Autism - Primary (Chronic)    Relevant Medications    ARIPiprazole (ABILIFY) 5 MG tablet    fluvoxaMINE (LUVOX) 50 MG tablet    atomoxetine (STRATTERA) 80 MG capsule     Other Visit Diagnoses       ADHD, predominantly inattentive type  (Chronic)       Relevant Medications    ARIPiprazole (ABILIFY) 5 MG tablet    fluvoxaMINE (LUVOX) 50 MG tablet    atomoxetine (STRATTERA) 80 MG capsule          Diagnoses         Codes Comments    Autism    -  Primary ICD-10-CM: F84.0  ICD-9-CM: 299.00     Obsessive-compulsive disorder, unspecified type     ICD-10-CM: F42.9  ICD-9-CM: 300.3     Tourette's syndrome     ICD-10-CM: F95.2  ICD-9-CM: 307.23     ADHD, predominantly inattentive type     ICD-10-CM: F90.0  ICD-9-CM: 314.00             Visit Diagnoses:    ICD-10-CM ICD-9-CM   1. Autism  F84.0 299.00   2. Obsessive-compulsive disorder, unspecified type  F42.9 300.3   3. Tourette's syndrome  F95.2 307.23   4. ADHD, predominantly inattentive type  F90.0 314.00           TREATMENT PLAN/GOALS: Continue supportive psychotherapy efforts and medications as indicated. Treatment and medication options discussed during today's visit. Patient ackowledged and verbally consented to continue with current treatment plan and was educated on the importance of compliance with treatment and follow-up appointments.    MEDICATION ISSUES:  INSPECT reviewed as expected - no controlled meds     1. Tourette's syndrome - cont abilify 5  mg po QHS  ,   Cont clonidine  0.2  mg BID wih BP monitor ,    2. OCD- cont fluvoxamine  75  mg po QHS, no changes necessary       3. Autism -  abilify 5 mg QHS  - tolerates well and reported efficacy       4. ADHD - cont   strattera 80 mg po QAM      The pt experiences increased fatigue and difficulties staying awake  Recommended to monitor BP, he is taking 2 meds in the AM, try to skip one day clonidine and  monitor frequency of tics and fatigue     !!! Will monitor     Consider referral to sleep medicine       PHQ scored 10 and indicated moderate  depression   DIANA 7 scored 8     The pt saw psychologist at [facility], was dsd with ADHD, OCD, autism     The pt will benefit from voc rehab  Autism support groups and services   Info about BDDS was provided     Discussed medication options and treatment plan of prescribed medication as well as the risks, benefits, and side effects including potential falls, possible impaired driving and metabolic adversities among others. Patient is agreeable to call the office with any worsening of symptoms or onset of side effects. Patient is agreeable to call 911 or go to the nearest ER should he/she begin having SI/HI. No medication side effects or related complaints today.     MEDS ORDERED DURING VISIT:  New Medications Ordered This Visit   Medications    ARIPiprazole (ABILIFY) 5 MG tablet     Sig: Take 1 tablet by mouth Every Night.     Dispense:  90 tablet     Refill:  1    fluvoxaMINE (LUVOX) 50 MG tablet     Sig: Take 1.5 tablets by mouth Every Night.     Dispense:  135 tablet     Refill:  1    cloNIDine (CATAPRES) 0.2 MG tablet     Sig: Take 1 tablet by mouth 2 (Two) Times a Day.     Dispense:  180 tablet     Refill:  0    atomoxetine (STRATTERA) 80 MG capsule     Sig: Take 1 capsule by mouth Daily.     Dispense:  90 capsule     Refill:  0       Return in about 3 months (around 5/2/2024).         This document has been electronically signed by Eduarda Carter MD  February 2, 2024 11:51 EST    Part of this note may be an electronic transcription/translation of spoken language to printed text using the Dragon Dictation System.

## 2024-02-05 ENCOUNTER — PRIOR AUTHORIZATION (OUTPATIENT)
Dept: PSYCHIATRY | Facility: CLINIC | Age: 24
End: 2024-02-05
Payer: COMMERCIAL

## 2024-02-05 NOTE — TELEPHONE ENCOUNTER
PA for atomoxetine hcl 80 mg capsules 90 for 90 submitted to Dorie [facility] CALL [phone] PHARMACY HELP DESK [phone])    Msg from pharm: MAXIMUM PATIENT AGE OF 18 YEARS 11 MONDRUG REQUIRES PRIOR AUTHORIZATION    Approved until 2-07-25.

## 2024-02-09 NOTE — TELEPHONE ENCOUNTER
Pt mother called concerned that Abilify that was started is causing side effects of tingling hands and feet, pt being anxious, and feeling cold and hot.  I informed her she could take him to [facility] ER or call her pharmacy while waiting for your response.   No

## 2024-05-08 ENCOUNTER — OFFICE VISIT (OUTPATIENT)
Dept: PSYCHIATRY | Facility: CLINIC | Age: 24
End: 2024-05-08
Payer: COMMERCIAL

## 2024-05-08 VITALS
BODY MASS INDEX: 31.38 KG/M2 | OXYGEN SATURATION: 95 % | WEIGHT: 188.6 LBS | SYSTOLIC BLOOD PRESSURE: 112 MMHG | HEART RATE: 106 BPM | DIASTOLIC BLOOD PRESSURE: 82 MMHG

## 2024-05-08 DIAGNOSIS — F42.9 OBSESSIVE-COMPULSIVE DISORDER, UNSPECIFIED TYPE: Chronic | ICD-10-CM

## 2024-05-08 DIAGNOSIS — F90.0 ADHD, PREDOMINANTLY INATTENTIVE TYPE: Chronic | ICD-10-CM

## 2024-05-08 DIAGNOSIS — F95.2 TOURETTE'S SYNDROME: ICD-10-CM

## 2024-05-08 DIAGNOSIS — F84.0 AUTISM: Primary | Chronic | ICD-10-CM

## 2024-05-08 RX ORDER — ATOMOXETINE 80 MG/1
80 CAPSULE ORAL DAILY
Qty: 90 CAPSULE | Refills: 1 | Status: SHIPPED | OUTPATIENT
Start: 2024-05-08

## 2024-05-08 RX ORDER — CLONIDINE HYDROCHLORIDE 0.2 MG/1
0.2 TABLET ORAL 2 TIMES DAILY
Qty: 180 TABLET | Refills: 1 | Status: SHIPPED | OUTPATIENT
Start: 2024-05-08

## 2024-05-08 RX ORDER — ARIPIPRAZOLE 5 MG/1
5 TABLET ORAL NIGHTLY
Qty: 90 TABLET | Refills: 1 | Status: SHIPPED | OUTPATIENT
Start: 2024-05-08

## 2024-05-08 RX ORDER — FLUVOXAMINE MALEATE 50 MG/1
75 TABLET, COATED ORAL NIGHTLY
Qty: 135 TABLET | Refills: 1 | Status: SHIPPED | OUTPATIENT
Start: 2024-05-08

## 2024-08-22 NOTE — TELEPHONE ENCOUNTER
Fax from [facility] asking for 90 day supply of aripiprazole 5 mg tablets.   Patient currently receiving [facility] at Home Services of SN, PT, OT, SLP.    Certification Period is 8/14/24 - 10/12/24.    An [facility] at [facility] HH Transfer Summary Report is available under Miscellaneous Reports within [facility].   Home care goals have not been met.     Please place orders to resume services upon discharge if appropriate.   Liaison will continue to follow until discharge.      Gilbert Hernandez RN  Home Care Liaison  [facility]

## 2024-10-20 DIAGNOSIS — F42.9 OBSESSIVE-COMPULSIVE DISORDER, UNSPECIFIED TYPE: Chronic | ICD-10-CM

## 2024-10-20 DIAGNOSIS — F84.0 AUTISM: Chronic | ICD-10-CM

## 2024-10-21 NOTE — TELEPHONE ENCOUNTER
Rx Refill Note  Requested Prescriptions     Pending Prescriptions Disp Refills    fluvoxaMINE (LUVOX) 50 MG tablet [Pharmacy Med Name: FLUVOXAMINE MALEATE 50 MG TAB] 135 tablet 1     Sig: TAKE 1.5 TABLETS BY MOUTH EVERY NIGHT    ARIPiprazole (ABILIFY) 5 MG tablet [Pharmacy Med Name: ARIPIPRAZOLE 5 MG TABLET] 90 tablet 1     Sig: TAKE 1 TABLET BY MOUTH EVERY DAY AT NIGHT      Last office visit with prescribing clinician: 5/8/2024   Last telemedicine visit with prescribing clinician: Visit date not found   Next office visit with prescribing clinician: 11/8/2024   Office Visit with Eduarda Carter MD (05/08/2024)                       Would you like a call back once the refill request has been completed: [] Yes [] No    If the office needs to give you a call back, can they leave a voicemail: [] Yes [] No    Cherelle Mora MA  10/21/24, 09:53 EDT

## 2024-10-23 RX ORDER — FLUVOXAMINE MALEATE 50 MG
75 TABLET ORAL NIGHTLY
Qty: 135 TABLET | Refills: 0 | Status: SHIPPED | OUTPATIENT
Start: 2024-10-23

## 2024-10-23 RX ORDER — ARIPIPRAZOLE 5 MG/1
5 TABLET ORAL
Qty: 90 TABLET | Refills: 0 | Status: SHIPPED | OUTPATIENT
Start: 2024-10-23

## 2024-11-08 ENCOUNTER — OFFICE VISIT (OUTPATIENT)
Dept: PSYCHIATRY | Facility: CLINIC | Age: 24
End: 2024-11-08
Payer: COMMERCIAL

## 2024-11-08 DIAGNOSIS — Z79.899 ENCOUNTER FOR LONG-TERM (CURRENT) USE OF MEDICATIONS: Primary | ICD-10-CM

## 2024-11-08 DIAGNOSIS — F95.2 TOURETTE'S SYNDROME: ICD-10-CM

## 2024-11-08 DIAGNOSIS — F84.0 AUTISM: Chronic | ICD-10-CM

## 2024-11-08 DIAGNOSIS — F42.9 OBSESSIVE-COMPULSIVE DISORDER, UNSPECIFIED TYPE: Chronic | ICD-10-CM

## 2024-11-08 DIAGNOSIS — F90.0 ADHD, PREDOMINANTLY INATTENTIVE TYPE: Chronic | ICD-10-CM

## 2024-11-08 RX ORDER — ATOMOXETINE 80 MG/1
80 CAPSULE ORAL DAILY
Qty: 90 CAPSULE | Refills: 1 | Status: SHIPPED | OUTPATIENT
Start: 2024-11-08

## 2024-11-08 RX ORDER — CLONIDINE HYDROCHLORIDE 0.2 MG/1
0.2 TABLET ORAL 2 TIMES DAILY
Qty: 180 TABLET | Refills: 1 | Status: SHIPPED | OUTPATIENT
Start: 2024-11-08

## 2024-11-08 RX ORDER — FLUVOXAMINE MALEATE 50 MG
75 TABLET ORAL NIGHTLY
Qty: 135 TABLET | Refills: 1 | Status: SHIPPED | OUTPATIENT
Start: 2024-11-08

## 2024-11-08 RX ORDER — ARIPIPRAZOLE 5 MG/1
5 TABLET ORAL
Qty: 90 TABLET | Refills: 1 | Status: SHIPPED | OUTPATIENT
Start: 2024-11-08

## 2024-11-08 NOTE — PROGRESS NOTES
Venipuncture Blood Specimen Collection  Venipuncture performed in RT AC by Cherelle Mora MA with good hemostasis. Patient tolerated the procedure well without complications.   11/08/24   Cherelle Mora MA

## 2024-11-08 NOTE — PROGRESS NOTES
"Subjective   Domenic Sandoval is a 24 y.o. male who presents today for follow up   The pt presented with his mother and he consented for her to stay     Chief Complaint:   decreased E, poor concentration, vocal tics     History of Present Illness: the pt started having involuntary movements, jerking movements of his arms and vocal tics (\"breakfast \" in funny childish voice, sometimes he is saying more complex sentences) , no stressors at that time. The pt goes to college  But has troubles with concentration  And difficult to complete tasks.  The pt reported issues with concentration since he remembers himself, when he had to do school work, chores at home , unable to stay focused at school, daydreaming , his grades dropped and failed few classes at college.   Now off school   Mood - anxious, getting nervous more often, but denied feeling depressed/hopeless/helpless   Denied AVH/SI/HI   + thought that he does not think they are his own , thoughts about parents having sex, those thoughts are very disturbing , unpleasant but he can not get rid of them   The pt does have few friends but prefers to play video games, avoids in person  interactions       Last visit - no med changes       Today the pt reported feeling more anxious, mom was dsd with breast cancer   ticks are not as often, overall mood is stable , but he feels tired in the AM after he takes meds   No anger outbursts  He is trying to stay busy, helping mother and still enjoys playing computer games   Still has unpleasant intrusive thoughts, but no plan, those thoughts do not affect his behavior     Concentration -fair on strattera       Sleep - hypersomnia , he stated after he takes meds   E level decreased   Denied AVH/SI/HI        The following portions of the patient's history were reviewed and updated as appropriate: allergies, current medications, past family history, past medical history, past social history, past surgical history and problem list.    PAST " PSYCHIATRIC HISTORY  Axis I  Affective/Bipolar Disorder, Anxiety/Panic Disorder  sol[facility] inpt x1, SA - by cutting wrists , no sutures required   Axis II  Defer     PAST OUTPATIENT TREATMENT  Diagnosis treated:  Affective Disorder, Anxiety/Panic Disorder  Therapist once , did not go back   Treatment Type:  Medication Management  Prior Psychiatric Medications:  Folates  Risperidone now for 1 year - effective but gained weight     Support Groups:  None   Sequelae Of Mental Disorder:  job disruption, social isolation, emotional distress          Interval History  No changes     Side Effects  Fatigue       Past Medical History:  Past Medical History:   Diagnosis Date    Anxiety        Social History:  Social History     Socioeconomic History    Marital status: Single   Tobacco Use    Smoking status: Never    Smokeless tobacco: Never   Vaping Use    Vaping status: Never Used   Substance and Sexual Activity    Alcohol use: Yes     Comment: 1- 2 per year     Drug use: Never    Sexual activity: Defer       Family History:  Family History   Problem Relation Age of Onset    Hypertension Father     Other Father         epilepsy    Anxiety disorder Father     Depression Father     Heart disease Maternal Grandfather        Past Surgical History:  History reviewed. No pertinent surgical history.    Problem List:  Patient Active Problem List   Diagnosis    Ingrown nail of fifth toe of left foot    Tick bite of toe of left foot with infection    Tourette's syndrome    Twitching    Generalized abdominal pain    Hematuria    Obsessive compulsive disorder    Autism       Allergy:   Allergies   Allergen Reactions    Penicillins Swelling        Discontinued Medications:  Medications Discontinued During This Encounter   Medication Reason    atomoxetine (STRATTERA) 80 MG capsule Reorder    cloNIDine (CATAPRES) 0.2 MG tablet Reorder    fluvoxaMINE (LUVOX) 50 MG tablet Reorder    ARIPiprazole (ABILIFY) 5 MG tablet Reorder                Current Medications:   Current Outpatient Medications   Medication Sig Dispense Refill    ARIPiprazole (ABILIFY) 5 MG tablet Take 1 tablet by mouth every night at bedtime. 90 tablet 1    atomoxetine (STRATTERA) 80 MG capsule Take 1 capsule by mouth Daily. 90 capsule 1    cloNIDine (CATAPRES) 0.2 MG tablet Take 1 tablet by mouth 2 (Two) Times a Day. 180 tablet 1    fluvoxaMINE (LUVOX) 50 MG tablet Take 1.5 tablets by mouth Every Night. 135 tablet 1     No current facility-administered medications for this visit.         Psychological ROS: positive for - anxiety, concentration difficulties and involuntary movements , obsessive thoughts , vocal ticks   negative for - depression, disorientation, hallucinations, hostility, irritability or mood swings      Physical Exam:   There were no vitals taken for this visit.    Mental Status Exam:   Hygiene:   good  Cooperation:  Cooperative,    Eye Contact:  Poor  Psychomotor Behavior:  Appropriate  Affect:  Blunted  Mood: fluctates  Hopelessness: Denies  Speech:  Monotone vocal tics  Thought Process:  Goal directed and Linear  Thought Content:  Mood congruent, intrusive and obsessive thoughts with unpleasant content   Suicidal:  None  Homicidal:  None  Hallucinations:  None  Delusion:  None  Memory:  Intact  Orientation:  Person, Place, Time and Situation  Reliability:  fair  Insight:  Fair  Judgement:  Fair  Impulse Control:  Poor  Physical/Medical Issues:  No      MSE from 5/8/24  reviewed and accepted without changes     PHQ-9 Depression Screening  Little interest or pleasure in doing things? Several days   Feeling down, depressed, or hopeless? Several days   PHQ-2 Total Score 2   Trouble falling or staying asleep, or sleeping too much? Several days   Feeling tired or having little energy? Over half   Poor appetite or overeating? Not at all   Feeling bad about yourself - or that you are a failure or have let yourself or your family down? Several days   Trouble  concentrating on things, such as reading the newspaper or watching television? Over half   Moving or speaking so slowly that other people could have noticed? Or the opposite - being so fidgety or restless that you have been moving around a lot more than usual? Not at all   Thoughts that you would be better off dead, or of hurting yourself in some way? Not at all   PHQ-9 Total Score 8   If you checked off any problems, how difficult have these problems made it for you to do your work, take care of things at home, or get along with other people? Somewhat difficult    Over the last two weeks, how often have you been bothered by the following problems?  Feeling nervous, anxious or on edge: More than half the days  Not being able to stop or control worrying: More than half the days  Worrying too much about different things: More than half the days  Trouble Relaxing: Several days  Being so restless that it is hard to sit still: Not at all  Becoming easily annoyed or irritable: Not at all  Feeling afraid as if something awful might happen: More than half the days  DIANA 7 Total Score: 9  If you checked any problems, how difficult have these problems made it for you to do your work, take care of things at home, or get along with other people: Very difficult           Never smoker    I advised Domenic of the risks of tobacco use.     Lab Results:   No visits with results within 3 Month(s) from this visit.   Latest known visit with results is:   Office Visit on 12/20/2021   Component Date Value Ref Range Status    Color 12/20/2021 Yellow  Yellow, Straw, Dark Yellow, Maude Final    Clarity, UA 12/20/2021 Clear  Clear Final    Specific Gravity  12/20/2021 1.010  1.005 - 1.030 Final    pH, Urine 12/20/2021 5.0  5.0 - 8.0 Final    Leukocytes 12/20/2021 Negative  Negative Final    Nitrite, UA 12/20/2021 Negative  Negative Final    Protein, POC 12/20/2021 Trace (A)  Negative mg/dL Final    Glucose, UA 12/20/2021 Negative  Negative,  1000 mg/dL (3+) mg/dL Final    Ketones, UA 12/20/2021 Trace (A)  Negative Final    Urobilinogen, UA 12/20/2021 Normal  Normal Final    Bilirubin 12/20/2021 Small (1+) (A)  Negative Final    Blood, UA 12/20/2021 Negative  Negative Final    Lot Number 12/20/2021 na   Final    Expiration Date 12/20/2021 na   Final       Assessment & Plan   Problems Addressed this Visit       Tourette's syndrome    Relevant Medications    fluvoxaMINE (LUVOX) 50 MG tablet    cloNIDine (CATAPRES) 0.2 MG tablet    atomoxetine (STRATTERA) 80 MG capsule    ARIPiprazole (ABILIFY) 5 MG tablet    Obsessive compulsive disorder (Chronic)    Relevant Medications    fluvoxaMINE (LUVOX) 50 MG tablet    atomoxetine (STRATTERA) 80 MG capsule    ARIPiprazole (ABILIFY) 5 MG tablet    Autism (Chronic)    Relevant Medications    fluvoxaMINE (LUVOX) 50 MG tablet    atomoxetine (STRATTERA) 80 MG capsule    ARIPiprazole (ABILIFY) 5 MG tablet     Other Visit Diagnoses       Encounter for long-term (current) use of medications    -  Primary    Relevant Orders    Lipid Panel    Hemoglobin A1c    Comprehensive Metabolic Panel    ADHD, predominantly inattentive type  (Chronic)       Relevant Medications    fluvoxaMINE (LUVOX) 50 MG tablet    atomoxetine (STRATTERA) 80 MG capsule    ARIPiprazole (ABILIFY) 5 MG tablet          Diagnoses         Codes Comments    Encounter for long-term (current) use of medications    -  Primary ICD-10-CM: Z79.899  ICD-9-CM: V58.69     Obsessive-compulsive disorder, unspecified type     ICD-10-CM: F42.9  ICD-9-CM: 300.3     Tourette's syndrome     ICD-10-CM: F95.2  ICD-9-CM: 307.23     ADHD, predominantly inattentive type     ICD-10-CM: F90.0  ICD-9-CM: 314.00     Autism     ICD-10-CM: F84.0  ICD-9-CM: 299.00             Visit Diagnoses:    ICD-10-CM ICD-9-CM   1. Encounter for long-term (current) use of medications  Z79.899 V58.69   2. Obsessive-compulsive disorder, unspecified type  F42.9 300.3   3. Tourette's syndrome  F95.2  307.23   4. ADHD, predominantly inattentive type  F90.0 314.00   5. Autism  F84.0 299.00               TREATMENT PLAN/GOALS: Continue supportive psychotherapy efforts and medications as indicated. Treatment and medication options discussed during today's visit. Patient ackowledged and verbally consented to continue with current treatment plan and was educated on the importance of compliance with treatment and follow-up appointments.    MEDICATION ISSUES:  INSPECT reviewed as expected - no controlled meds     1. Tourette's syndrome - cont abilify 5  mg po QHS  ,   Cont clonidine  0.2  mg BID with BP monitor, try to take clonidine 0.2 mg po QHS only due to possible AM sedation     2. OCD- cont fluvoxamine  75  mg po QHS, recommended to take before bed to decrease AM sedation        3. Autism -  abilify 5 mg QHS to decrease AM sedation       4. ADHD - cont   strattera 80 mg po QAM      The pt experiences increased fatigue and difficulties staying awake  He tried not to take meds and noticed that meds  do cause sedation        PHQ scored 9 and indicated mild   depression   DIANA 7 scored 9 mild anxiety     The pt saw psychologist at [facility], was dsd with ADHD, OCD, autism     The pt will benefit from voc rehab, Autism support groups and services     Discussed medication options and treatment plan of prescribed medication as well as the risks, benefits, and side effects including potential falls, possible impaired driving and metabolic adversities among others. Patient is agreeable to call the office with any worsening of symptoms or onset of side effects. Patient is agreeable to call 911 or go to the nearest ER should he/she begin having SI/HI. No medication side effects or related complaints today.     MEDS ORDERED DURING VISIT:  New Medications Ordered This Visit   Medications    fluvoxaMINE (LUVOX) 50 MG tablet     Sig: Take 1.5 tablets by mouth Every Night.     Dispense:  135 tablet     Refill:  1    cloNIDine  (CATAPRES) 0.2 MG tablet     Sig: Take 1 tablet by mouth 2 (Two) Times a Day.     Dispense:  180 tablet     Refill:  1    atomoxetine (STRATTERA) 80 MG capsule     Sig: Take 1 capsule by mouth Daily.     Dispense:  90 capsule     Refill:  1    ARIPiprazole (ABILIFY) 5 MG tablet     Sig: Take 1 tablet by mouth every night at bedtime.     Dispense:  90 tablet     Refill:  1       Return in about 6 months (around 5/8/2025).         This document has been electronically signed by Eduarda Carter MD  November 8, 2024 10:15 EST    Part of this note may be an electronic transcription/translation of spoken language to printed text using the Dragon Dictation System.

## 2025-07-10 ENCOUNTER — OFFICE VISIT (OUTPATIENT)
Dept: PSYCHIATRY | Facility: CLINIC | Age: 25
End: 2025-07-10
Payer: COMMERCIAL

## 2025-07-10 ENCOUNTER — PRIOR AUTHORIZATION (OUTPATIENT)
Dept: PSYCHIATRY | Facility: CLINIC | Age: 25
End: 2025-07-10
Payer: COMMERCIAL

## 2025-07-10 VITALS
SYSTOLIC BLOOD PRESSURE: 116 MMHG | WEIGHT: 185.2 LBS | HEART RATE: 105 BPM | BODY MASS INDEX: 30.82 KG/M2 | DIASTOLIC BLOOD PRESSURE: 82 MMHG | OXYGEN SATURATION: 97 %

## 2025-07-10 DIAGNOSIS — F95.2 TOURETTE'S SYNDROME: Chronic | ICD-10-CM

## 2025-07-10 DIAGNOSIS — F84.0 AUTISM: Primary | Chronic | ICD-10-CM

## 2025-07-10 DIAGNOSIS — F90.0 ADHD, PREDOMINANTLY INATTENTIVE TYPE: Chronic | ICD-10-CM

## 2025-07-10 DIAGNOSIS — Z00.00 HEALTHCARE MAINTENANCE: ICD-10-CM

## 2025-07-10 DIAGNOSIS — F42.9 OBSESSIVE-COMPULSIVE DISORDER, UNSPECIFIED TYPE: Chronic | ICD-10-CM

## 2025-07-10 RX ORDER — ATOMOXETINE 80 MG/1
80 CAPSULE ORAL DAILY
Qty: 90 CAPSULE | Refills: 1 | Status: SHIPPED | OUTPATIENT
Start: 2025-07-10

## 2025-07-10 RX ORDER — ARIPIPRAZOLE 5 MG/1
5 TABLET ORAL
Qty: 90 TABLET | Refills: 1 | Status: SHIPPED | OUTPATIENT
Start: 2025-07-10

## 2025-07-10 RX ORDER — CLONIDINE HYDROCHLORIDE 0.2 MG/1
0.2 TABLET ORAL NIGHTLY
Qty: 90 TABLET | Refills: 1 | Status: SHIPPED | OUTPATIENT
Start: 2025-07-10

## 2025-07-10 RX ORDER — FLUVOXAMINE MALEATE 50 MG
75 TABLET ORAL NIGHTLY
Qty: 135 TABLET | Refills: 1 | Status: SHIPPED | OUTPATIENT
Start: 2025-07-10

## 2025-07-10 NOTE — TELEPHONE ENCOUNTER
PA for continuation of therapy for Atomoxetine 80 mg done on cover my meds for [facility]

## 2025-07-10 NOTE — PROGRESS NOTES
"Subjective   Domenic Sandoval is a 25 y.o. male who presents today for follow up   The pt presented with his mother and he consented for her to stay     Chief Complaint:   decreased E, poor concentration, vocal tics     History of Present Illness: the pt started having involuntary movements, jerking movements of his arms and vocal tics (\"breakfast \" in funny childish voice, sometimes he is saying more complex sentences) , no stressors at that time. The pt goes to college  But has troubles with concentration  And difficult to complete tasks.  The pt reported issues with concentration since he remembers himself, when he had to do school work, chores at home , unable to stay focused at school, daydreaming , his grades dropped and failed few classes at college.   Now off school   Mood - anxious, getting nervous more often, but denied feeling depressed/hopeless/helpless   Denied AVH/SI/HI   + thought that he does not think they are his own , thoughts about parents having sex, those thoughts are very disturbing , unpleasant but he can not get rid of them   The pt does have few friends but prefers to play video games, avoids in person  interactions       Last visit - fluvoxamine was  changed to QHS and clonidine was changed to one per day to decrease sedation        Today the pt reported no major changes, less sedation after med adjustment  mom was dsd with breast cancer   tics are not as often, overall mood is stable , denied feeling depressed/hopeless/helpless   No anger outbursts  He is trying to stay busy, helping mother and still enjoys playing computer games   Still has unpleasant intrusive thoughts, but no plan, those thoughts do not affect his behavior     Concentration -fair on strattera       Sleep - fair   E level fluctuates   Denied AVH/SI/HI        The following portions of the patient's history were reviewed and updated as appropriate: allergies, current medications, past family history, past medical history, " past social history, past surgical history and problem list.    PAST PSYCHIATRIC HISTORY  Axis I  Affective/Bipolar Disorder, Anxiety/Panic Disorder  [facility] inpt x1, SA - by cutting wrists , no sutures required   Axis II  Defer     PAST OUTPATIENT TREATMENT  Diagnosis treated:  Affective Disorder, Anxiety/Panic Disorder  Therapist once , did not go back   Treatment Type:  Medication Management  Prior Psychiatric Medications:  Folates  Risperidone now for 1 year - effective but gained weight     Support Groups:  None   Sequelae Of Mental Disorder:  job disruption, social isolation, emotional distress          Interval History  No changes     Side Effects  Fatigue       Past Medical History:  Past Medical History:   Diagnosis Date    Anxiety        Social History:  Social History     Socioeconomic History    Marital status: Single   Tobacco Use    Smoking status: Never    Smokeless tobacco: Never   Vaping Use    Vaping status: Never Used   Substance and Sexual Activity    Alcohol use: Yes     Comment: 1- 2 per year     Drug use: Never    Sexual activity: Defer       Family History:  Family History   Problem Relation Age of Onset    Hypertension Father     Other Father         epilepsy    Anxiety disorder Father     Depression Father     Heart disease Maternal Grandfather        Past Surgical History:  History reviewed. No pertinent surgical history.    Problem List:  Patient Active Problem List   Diagnosis    Ingrown nail of fifth toe of left foot    Tick bite of toe of left foot with infection    Tourette's syndrome    Twitching    Generalized abdominal pain    Hematuria    Obsessive compulsive disorder    Autism    ADHD, predominantly inattentive type       Allergy:   Allergies   Allergen Reactions    Penicillins Swelling        Discontinued Medications:  Medications Discontinued During This Encounter   Medication Reason    fluvoxaMINE (LUVOX) 50 MG tablet Reorder    cloNIDine (CATAPRES) 0.2 MG tablet Reorder     atomoxetine (STRATTERA) 80 MG capsule Reorder    ARIPiprazole (ABILIFY) 5 MG tablet Reorder                 Current Medications:   Current Outpatient Medications   Medication Sig Dispense Refill    ARIPiprazole (ABILIFY) 5 MG tablet Take 1 tablet by mouth every night at bedtime. 90 tablet 1    atomoxetine (STRATTERA) 80 MG capsule Take 1 capsule by mouth Daily. 90 capsule 1    cloNIDine (CATAPRES) 0.2 MG tablet Take 1 tablet by mouth Every Night. 90 tablet 1    fluvoxaMINE (LUVOX) 50 MG tablet Take 1.5 tablets by mouth Every Night. 135 tablet 1     No current facility-administered medications for this visit.         Psychological ROS: positive for - anxiety, concentration difficulties and involuntary movements , obsessive thoughts , vocal ticks   negative for - depression, disorientation, hallucinations, hostility, irritability or mood swings      Physical Exam:   Blood pressure 116/82, pulse 105, weight 84 kg (185 lb 3.2 oz), SpO2 97%.    Mental Status Exam:   Hygiene:   good  Cooperation:  Cooperative,    Eye Contact:  Poor  Psychomotor Behavior:  Appropriate  Affect:  Blunted  Mood: fluctates  Hopelessness: Denies  Speech:  Monotone vocal tics  Thought Process:  Goal directed and Linear  Thought Content:  Mood congruent, intrusive and obsessive thoughts with unpleasant content   Suicidal:  None  Homicidal:  None  Hallucinations:  None  Delusion:  None  Memory:  Intact  Orientation:  Person, Place, Time and Situation  Reliability:  fair  Insight:  Fair  Judgement:  Fair  Impulse Control:  Poor  Physical/Medical Issues:  No      MSE from 11/8/24  reviewed and accepted without changes     PHQ-9 Depression Screening  Little interest or pleasure in doing things? Several days   Feeling down, depressed, or hopeless? Several days   PHQ-2 Total Score 2   Trouble falling or staying asleep, or sleeping too much? Not at all   Feeling tired or having little energy? Several days   Poor appetite or overeating? Not at all    Feeling bad about yourself - or that you are a failure or have let yourself or your family down? Several days   Trouble concentrating on things, such as reading the newspaper or watching television? Several days   Moving or speaking so slowly that other people could have noticed? Or the opposite - being so fidgety or restless that you have been moving around a lot more than usual? Not at all   Thoughts that you would be better off dead, or of hurting yourself in some way? Not at all   PHQ-9 Total Score 5   If you checked off any problems, how difficult have these problems made it for you to do your work, take care of things at home, or get along with other people? Somewhat difficult    Over the last two weeks, how often have you been bothered by the following problems?  Feeling nervous, anxious or on edge: Several days  Not being able to stop or control worrying: Several days  Worrying too much about different things: Several days  Trouble Relaxing: Not at all  Being so restless that it is hard to sit still: Not at all  Becoming easily annoyed or irritable: Not at all  Feeling afraid as if something awful might happen: Several days  DIANA 7 Total Score: 4  If you checked any problems, how difficult have these problems made it for you to do your work, take care of things at home, or get along with other people: Very difficult           Never smoker    I advised Domenic of the risks of tobacco use.     Lab Results:   No visits with results within 3 Month(s) from this visit.   Latest known visit with results is:   Office Visit on 12/20/2021   Component Date Value Ref Range Status    Color 12/20/2021 Yellow  Yellow, Straw, Dark Yellow, Maude Final    Clarity, UA 12/20/2021 Clear  Clear Final    Specific Gravity  12/20/2021 1.010  1.005 - 1.030 Final    pH, Urine 12/20/2021 5.0  5.0 - 8.0 Final    Leukocytes 12/20/2021 Negative  Negative Final    Nitrite, UA 12/20/2021 Negative  Negative Final    Protein, POC 12/20/2021  Trace (A)  Negative mg/dL Final    Glucose, UA 12/20/2021 Negative  Negative, 1000 mg/dL (3+) mg/dL Final    Ketones, UA 12/20/2021 Trace (A)  Negative Final    Urobilinogen, UA 12/20/2021 Normal  Normal Final    Bilirubin 12/20/2021 Small (1+) (A)  Negative Final    Blood, UA 12/20/2021 Negative  Negative Final    Lot Number 12/20/2021 na   Final    Expiration Date 12/20/2021 na   Final       Assessment & Plan   Problems Addressed this Visit       Tourette's syndrome    Relevant Medications    fluvoxaMINE (LUVOX) 50 MG tablet    cloNIDine (CATAPRES) 0.2 MG tablet    atomoxetine (STRATTERA) 80 MG capsule    ARIPiprazole (ABILIFY) 5 MG tablet    Obsessive compulsive disorder (Chronic)    Relevant Medications    fluvoxaMINE (LUVOX) 50 MG tablet    atomoxetine (STRATTERA) 80 MG capsule    ARIPiprazole (ABILIFY) 5 MG tablet    Autism - Primary (Chronic)    Relevant Medications    fluvoxaMINE (LUVOX) 50 MG tablet    atomoxetine (STRATTERA) 80 MG capsule    ARIPiprazole (ABILIFY) 5 MG tablet    ADHD, predominantly inattentive type (Chronic)    Relevant Medications    fluvoxaMINE (LUVOX) 50 MG tablet    atomoxetine (STRATTERA) 80 MG capsule    ARIPiprazole (ABILIFY) 5 MG tablet     Other Visit Diagnoses         Healthcare maintenance        Relevant Orders    Ambulatory Referral to Family Practice          Diagnoses         Codes Comments      Autism    -  Primary ICD-10-CM: F84.0  ICD-9-CM: 299.00       Obsessive-compulsive disorder, unspecified type     ICD-10-CM: F42.9  ICD-9-CM: 300.3       Tourette's syndrome     ICD-10-CM: F95.2  ICD-9-CM: 307.23       ADHD, predominantly inattentive type     ICD-10-CM: F90.0  ICD-9-CM: 314.00       Healthcare maintenance     ICD-10-CM: Z00.00  ICD-9-CM: V70.0             Visit Diagnoses:    ICD-10-CM ICD-9-CM   1. Autism  F84.0 299.00   2. Obsessive-compulsive disorder, unspecified type  F42.9 300.3   3. Tourette's syndrome  F95.2 307.23   4. ADHD, predominantly inattentive type   F90.0 314.00   5. Healthcare maintenance  Z00.00 V70.0                 TREATMENT PLAN/GOALS: Continue supportive psychotherapy efforts and medications as indicated. Treatment and medication options discussed during today's visit. Patient ackowledged and verbally consented to continue with current treatment plan and was educated on the importance of compliance with treatment and follow-up appointments.    MEDICATION ISSUES:  INSPECT reviewed as expected - no controlled meds     1. Tourette's syndrome - cont abilify 5  mg po QHS  ,   Cont clonidine  0.2  mg BID with BP monitor, decrease  clonidine 0.2 mg po QHS only due to   AM sedation     2. OCD- cont fluvoxamine  75  mg po QHS, recommended to take before bed to decrease AM sedation        3. Autism -  abilify 5 mg QHS to decrease AM sedation       4. ADHD - cont   strattera 80 mg po QAM      The pt experiences increased fatigue and difficulties staying awake  He tried not to take meds and noticed that meds  do cause sedation     CMP WNL, TG slightly elevated    LABORATORY - SCAN - CBC/CMP/MULTIPLE, [facility], 11/08/2024 (11/08/2024)       PHQ scored 2 and indicated mild   depression   DIANA 7 scored 5 mild anxiety     The pt saw psychologist at [facility], was dsd with ADHD, OCD, autism     The pt will benefit from voc rehab, Autism support groups and services     Discussed medication options and treatment plan of prescribed medication as well as the risks, benefits, and side effects including potential falls, possible impaired driving and metabolic adversities among others. Patient is agreeable to call the office with any worsening of symptoms or onset of side effects. Patient is agreeable to call 911 or go to the nearest ER should he/she begin having SI/HI. No medication side effects or related complaints today.     MEDS ORDERED DURING VISIT:  New Medications Ordered This Visit   Medications    fluvoxaMINE (LUVOX) 50 MG tablet     Sig: Take 1.5 tablets by  mouth Every Night.     Dispense:  135 tablet     Refill:  1    cloNIDine (CATAPRES) 0.2 MG tablet     Sig: Take 1 tablet by mouth Every Night.     Dispense:  90 tablet     Refill:  1    atomoxetine (STRATTERA) 80 MG capsule     Sig: Take 1 capsule by mouth Daily.     Dispense:  90 capsule     Refill:  1    ARIPiprazole (ABILIFY) 5 MG tablet     Sig: Take 1 tablet by mouth every night at bedtime.     Dispense:  90 tablet     Refill:  1       Return in about 6 months (around 1/10/2026).         This document has been electronically signed by Eduarda Carter MD  July 10, 2025 08:17 EDT    Part of this note may be an electronic transcription/translation of spoken language to printed text using the Dragon Dictation System.

## 2025-07-10 NOTE — TELEPHONE ENCOUNTER
PA approved  #[employer] VINH/COBRA/DARNELL [id number]  7/10/25 to 7/10/26    Paper faxed to pharmacy